# Patient Record
Sex: MALE | Race: WHITE
[De-identification: names, ages, dates, MRNs, and addresses within clinical notes are randomized per-mention and may not be internally consistent; named-entity substitution may affect disease eponyms.]

---

## 2019-02-05 ENCOUNTER — HOSPITAL ENCOUNTER (INPATIENT)
Dept: HOSPITAL 97 - ER | Age: 79
LOS: 3 days | Discharge: HOME HEALTH | DRG: 291 | End: 2019-02-08
Attending: FAMILY MEDICINE | Admitting: INTERNAL MEDICINE
Payer: COMMERCIAL

## 2019-02-05 VITALS — BODY MASS INDEX: 38.9 KG/M2

## 2019-02-05 DIAGNOSIS — M06.9: ICD-10-CM

## 2019-02-05 DIAGNOSIS — I50.33: ICD-10-CM

## 2019-02-05 DIAGNOSIS — N18.3: ICD-10-CM

## 2019-02-05 DIAGNOSIS — I25.10: ICD-10-CM

## 2019-02-05 DIAGNOSIS — I48.91: ICD-10-CM

## 2019-02-05 DIAGNOSIS — D63.1: ICD-10-CM

## 2019-02-05 DIAGNOSIS — Z79.4: ICD-10-CM

## 2019-02-05 DIAGNOSIS — E83.51: ICD-10-CM

## 2019-02-05 DIAGNOSIS — R80.9: ICD-10-CM

## 2019-02-05 DIAGNOSIS — E66.01: ICD-10-CM

## 2019-02-05 DIAGNOSIS — E44.0: ICD-10-CM

## 2019-02-05 DIAGNOSIS — E11.65: ICD-10-CM

## 2019-02-05 DIAGNOSIS — N17.9: ICD-10-CM

## 2019-02-05 DIAGNOSIS — I13.0: Primary | ICD-10-CM

## 2019-02-05 DIAGNOSIS — J44.9: ICD-10-CM

## 2019-02-05 DIAGNOSIS — M10.9: ICD-10-CM

## 2019-02-05 DIAGNOSIS — E11.22: ICD-10-CM

## 2019-02-05 DIAGNOSIS — Z79.01: ICD-10-CM

## 2019-02-05 DIAGNOSIS — I27.20: ICD-10-CM

## 2019-02-05 DIAGNOSIS — J96.10: ICD-10-CM

## 2019-02-05 LAB
ALBUMIN SERPL BCP-MCNC: 2.8 G/DL (ref 3.4–5)
ALP SERPL-CCNC: 203 U/L (ref 45–117)
ALT SERPL W P-5'-P-CCNC: 103 U/L (ref 12–78)
ANISOCYTOSIS BLD QL: (no result)
AST SERPL W P-5'-P-CCNC: 53 U/L (ref 15–37)
BLD SMEAR INTERP: (no result)
BUN BLD-MCNC: 65 MG/DL (ref 7–18)
GLUCOSE SERPLBLD-MCNC: 334 MG/DL (ref 74–106)
HCT VFR BLD CALC: 30.2 % (ref 39.6–49)
HYPOCHROMIA BLD QL: (no result)
LIPASE SERPL-CCNC: 496 U/L (ref 73–393)
LYMPHOCYTES # SPEC AUTO: 1.7 K/UL (ref 0.7–4.9)
MAGNESIUM SERPL-MCNC: 1.9 MG/DL (ref 1.8–2.4)
MORPHOLOGY BLD-IMP: (no result)
OVALOCYTES BLD QL SMEAR: (no result)
PMV BLD: 9 FL (ref 7.6–11.3)
POLYCHROMASIA BLD QL SMEAR: (no result)
POTASSIUM SERPL-SCNC: 3.6 MMOL/L (ref 3.5–5.1)
RBC # BLD: 3.31 M/UL (ref 4.33–5.43)
TROPONIN (EMERG DEPT USE ONLY): 0.02 NG/ML (ref 0–0.04)

## 2019-02-05 PROCEDURE — 84100 ASSAY OF PHOSPHORUS: CPT

## 2019-02-05 PROCEDURE — 85025 COMPLETE CBC W/AUTO DIFF WBC: CPT

## 2019-02-05 PROCEDURE — 93306 TTE W/DOPPLER COMPLETE: CPT

## 2019-02-05 PROCEDURE — 99284 EMERGENCY DEPT VISIT MOD MDM: CPT

## 2019-02-05 PROCEDURE — 81003 URINALYSIS AUTO W/O SCOPE: CPT

## 2019-02-05 PROCEDURE — 80048 BASIC METABOLIC PNL TOTAL CA: CPT

## 2019-02-05 PROCEDURE — 84550 ASSAY OF BLOOD/URIC ACID: CPT

## 2019-02-05 PROCEDURE — 83880 ASSAY OF NATRIURETIC PEPTIDE: CPT

## 2019-02-05 PROCEDURE — 97530 THERAPEUTIC ACTIVITIES: CPT

## 2019-02-05 PROCEDURE — 93005 ELECTROCARDIOGRAM TRACING: CPT

## 2019-02-05 PROCEDURE — 83735 ASSAY OF MAGNESIUM: CPT

## 2019-02-05 PROCEDURE — 80076 HEPATIC FUNCTION PANEL: CPT

## 2019-02-05 PROCEDURE — 80061 LIPID PANEL: CPT

## 2019-02-05 PROCEDURE — 97163 PT EVAL HIGH COMPLEX 45 MIN: CPT

## 2019-02-05 PROCEDURE — 71045 X-RAY EXAM CHEST 1 VIEW: CPT

## 2019-02-05 PROCEDURE — 36415 COLL VENOUS BLD VENIPUNCTURE: CPT

## 2019-02-05 PROCEDURE — 81015 MICROSCOPIC EXAM OF URINE: CPT

## 2019-02-05 PROCEDURE — 94760 N-INVAS EAR/PLS OXIMETRY 1: CPT

## 2019-02-05 PROCEDURE — 84484 ASSAY OF TROPONIN QUANT: CPT

## 2019-02-05 PROCEDURE — 82962 GLUCOSE BLOOD TEST: CPT

## 2019-02-05 PROCEDURE — 96374 THER/PROPH/DIAG INJ IV PUSH: CPT

## 2019-02-05 PROCEDURE — 97116 GAIT TRAINING THERAPY: CPT

## 2019-02-05 PROCEDURE — 83690 ASSAY OF LIPASE: CPT

## 2019-02-05 NOTE — P.HP
Certification for Inpatient


Patient admitted to: Inpatient


With expected LOS: >2 Midnights


Practitioner: I am a practitioner with admitting privileges, knowledge of 

patient current condition, hospital course, and medical plan of care.


Services: Services provided to patient in accordance with Admission 

requirements found in Title 42 Section 412.3 of the Code of Federal Regulations





Patient History


Date of Service: 02/05/19


Reason for admission: acute on chronic systolic CHF


History of Present Illness: 





Mr Gloria is an 79 years old male with history of DM II, COPD with home oxygen 

at 3.5L by NC, HTN, Chronic A.Fib anticoagulated with apixiban, chronic 

systolic CHF, last ECHO shows EF 35%, admitted to [facility] about 1 month ago due to 

pulmonary edema, complicated with respiratory failure, requiring mechanical 

ventilation. The patient came to ED at this time complaining of progressive 

increasing in LE edema, also abdominal girth increased. He complain of SOB with 

minimal activity, improving with rest. His nephrologist has increased Bumex 

dose recently due to his symptoms, but did not work. No fever or chills 

reported. Lab work remarkable for abnormal renal function, elevated proBNP. CXR 

consistent with pulmonary edema. 


Allergies





No Known Allergies Allergy (Unverified 02/05/19 20:33)


 





Home medications list reviewed: Yes


Home Medications: 








Allopurinol 300 mg PO DAILY 02/05/19 


Apixaban [Eliquis *] 2.5 mg PO BID 02/05/19 


Brimonidine Tartrate [Alphagan P] 1 drop OP BEDTIME 02/05/19 


Bumetanide [Bumex] 2 mg PO BID 02/05/19 


Dorzolamide HCl/Pf [Dorzolamide 2% Eye Drop] 1 drop OP DAILY 02/05/19 


Ferrous Sulfate 325 mg PO TIDWM 02/05/19 


Gabapentin 900 mg PO BID 02/05/19 


Insulin Detemir [Levemir] 16 unit SQ BID 02/05/19 


Metoprolol Tartrate 50 mg PO BID 02/05/19 


Simvastatin 20 mg PO DAILY 02/05/19 


Tamsulosin HCl 0.4 mg PO DAILY 02/05/19 


predniSONE [Prednisone*] 5 mg PO DAILY 02/05/19 


traMADol HCL [Ultram*] 50 mg PO BIDP PRN 02/05/19 








- Past Medical/Surgical History


Has patient received pneumonia vaccine in the past: Yes


Diabetic: Yes


-: IDDM


-: CHF


-: Afib


-: MI


-: Rheumatoid Arthritis


-: Chronic Kidney Disease


-: Knees bilaterally


-: kidney





- Family History


  ** Sister


-: Hypertension, Diabetes, Cancer





- Social History


Smoking Status: Never smoker


Alcohol use: No


CD- Drugs: No


Caffeine use: Yes


Place of Residence: Home





Review of Systems


10-point ROS is otherwise unremarkable





Physical Examination





- Vital Signs


Temperature: 98.2 F


Blood Pressure: 130/76


Pulse: 90


Respirations: 16


Pulse Ox (%): 98





- Physical Exam


General: Alert, In no apparent distress


HEENT: Atraumatic, PERRLA, Mucous membr. moist/pink, EOMI, Sclerae nonicteric


Neck: Supple, 2+ carotid pulse no bruit, No LAD, Without JVD or thyroid 

abnormality


Respiratory: Diminished, Crackles/rales (bibasilar rales)


Cardiovascular: Normal S1 S2, Irregular heart rate/rhythm


Gastrointestinal: Normal bowel sounds, No tenderness


Musculoskeletal: No tenderness, Swelling (LE edema 2+)


Integumentary: No rashes


Neurological: Normal speech, Normal strength at 5/5 x4 extr, Normal tone, 

Normal affect


Lymphatics: No axilla or inguinal lymphadenopathy





- Studies


Laboratory Data (last 24 hrs)





02/05/19 17:40: WBC 6.7, Hgb 9.2 L, Hct 30.2 L, Plt Count 183


02/05/19 17:40: Sodium 141, Potassium 3.6, BUN 65 H, Creatinine 2.46 H, Glucose 

334 H, Magnesium 1.9, Total Bilirubin 0.2, AST 53 H,  H, Alkaline 

Phosphatase 203 H, Lipase 496 H








Assessment and Plan





- Problems (Diagnosis)


(1) Acute on chronic systolic CHF (congestive heart failure)


Current Visit: Yes   Status: Acute   





(2) Diabetes mellitus


Current Visit: Yes   Status: Acute   


Qualifiers: 


   Diabetes mellitus type: type 2   Diabetes mellitus long term insulin use: 

with long term use   Diabetes mellitus complication status: with unspecified 

complications   Qualified Code(s): E11.8 - Type 2 diabetes mellitus with 

unspecified complications; Z79.4 - Long term (current) use of insulin   





(3) COPD (chronic obstructive pulmonary disease)


Current Visit: Yes   Status: Acute   


Qualifiers: 


   COPD type: unspecified COPD   Qualified Code(s): J44.9 - Chronic obstructive 

pulmonary disease, unspecified   





(4) HTN (hypertension)


Current Visit: Yes   Status: Acute   


Qualifiers: 


   Hypertension type: essential hypertension   Qualified Code(s): I10 - 

Essential (primary) hypertension   





(5) CAD (coronary artery disease)


Current Visit: Yes   Status: Acute   


Qualifiers: 


   Coronary Disease-Associated Artery/Lesion type: native artery   Native vs. 

transplanted heart: native heart   Associated angina: without angina   

Qualified Code(s): I25.10 - Atherosclerotic heart disease of native coronary 

artery without angina pectoris   





- Plan





Will admit the patient due to acute on chronic CHF exacerbation. Will order IV 

lasix, he has recent cardiac work up done in [facility]. Will order cardiology 

consult.





- Advance Directives


Does patient have a Living Will: Yes


Does patient have a Durable POA for Healthcare: Yes





- Code Status/Comfort Care


Code Status Assessed: Yes


Code Status: Full Code

## 2019-02-05 NOTE — EDPHYS
Physician Documentation                                                                           

 [facility]                                                                

Name: Ken Gloria                                                                                

Age: 79 yrs                                                                                       

Sex: Male                                                                                         

: 1940                                                                                   

MRN: [id number]                                                                                   

Arrival Date: 2019                                                                          

Time: 16:24                                                                                       

Account#: [id number]                                                                            

Bed 19                                                                                            

Private MD:                                                                                       

ED Physician Kevin Doss                                                                      

HPI:                                                                                              

                                                                                             

19:37 This 79 yrs old  Male presents to ER via Wheelchair with complaints of         ps1 

      Breathing Difficulty.                                                                       

19:37 Patient has an extensive medical history with prolonged admission at [facility] IDDM, PEA     ps1 

      arrest in Dec, CKD, LVEF35% on bumex and now is fluid overloaded. Short of breath and       

      increased weight gain over last week. Leg and abdomen swelling. Can speak in full           

      sentences but labored. No fever. .                                                          

                                                                                                  

Historical:                                                                                       

- Allergies:                                                                                      

16:35 No Known Allergies;                                                                     rb1 

- PMHx:                                                                                           

16:35 Diabetes - IDDM; CHF; A-fib; Myocardial infarction; Rheumatoid Arthritis;               rb1 

- PSHx:                                                                                           

16:35 bilateral knees; kidney;                                                                rb1 

                                                                                                  

- Immunization history:: Adult Immunizations up to date.                                          

- Social history:: Smoking status: Patient/guardian denies using tobacco.                         

- Ebola Screening: : Patient negative for fever greater than or equal to 101.5 degrees            

  Fahrenheit, and additional compatible Ebola Virus Disease symptoms.                             

                                                                                                  

                                                                                                  

ROS:                                                                                              

19:37 Constitutional: Negative for fever, chills, and weight loss, Eyes: Negative for injury, ps1 

      pain, redness, and discharge, Cardiovascular: Negative for chest pain, palpitations,        

      and edema, Abdomen/GI: Negative for abdominal pain, nausea, vomiting, diarrhea, and         

      constipation, Back: Negative for injury and pain, Skin: Negative for injury, rash, and      

      discoloration, Neuro: Negative for headache, weakness, numbness, tingling, and seizure.     

19:37 Respiratory: Positive for dyspnea on exertion, shortness of breath, on exertion.            

19:37 MS/extremity: Positive for swelling, of the right leg and left leg.                         

                                                                                                  

Exam:                                                                                             

19:37 Constitutional:  This is a well developed, well nourished patient who is awake, alert,  ps1 

      and in no acute distress. Head/Face:  Normocephalic, atraumatic. Eyes:  Pupils equal        

      round and reactive to light, extra-ocular motions intact.  Lids and lashes normal.          

      Conjunctiva and sclera are non-icteric and not injected. Chest/axilla:  Normal chest        

      wall appearance and motion.  Nontender with no deformity.  No lesions are appreciated.      

      Cardiovascular:  Regular rate and rhythm.  No gallops, murmurs, or rubs.  Normal PMI,       

      no JVD.  No pulse deficits. Abdomen/GI:  Soft, non-tender, with normal bowel sounds.        

      No distension or tympany.  No guarding or rebound.  No evidence of tenderness               

      throughout. Back:  No spinal tenderness.  No costovertebral tenderness.  Full range of      

      motion. Skin:  Warm, dry with normal turgor.  Normal color with no rashes, no lesions,      

      and no evidence of cellulitis. Neuro:  Awake and alert, GCS 15, oriented to person,         

      place, time, and situation.  Cranial nerves II-XII grossly intact. Sensory grossly          

      intact.                                                                                     

19:37 Respiratory: the patient does not display signs of respiratory distress,  Respirations:     

      labored breathing, that is mild, Breath sounds: rhonchi.                                    

19:37 Musculoskeletal/extremity: Extremities: bilateral lower extremity edema extending           

      proximally towards the abdomen.                                                             

                                                                                                  

Vital Signs:                                                                                      

16:35  / 79; Pulse 71; Resp 22; Temp 97.4(TE); Pulse Ox 99% on 3 lpm NC; Weight 116.12  rb1 

      kg (R); Height 5 ft. 7 in. (170.18 cm) (R); Pain 0/10;                                      

17:29  / 78; Pulse 96 MON; Resp 21; Pulse Ox 100% on 3.5 lpm NC;                        sv  

19:05  / 71; Pulse 96; Resp 18 S; Pulse Ox 100% on R/A;                                 jd3 

20:15  / 56; Pulse 92; Resp 15; Pulse Ox 97% on 2 lpm NC;                               jb4 

16:35 Body Mass Index 40.09 (116.12 kg, 170.18 cm)                                            rb1 

17:29 A fib with Occasional PVCs                                                              sv  

                                                                                                  

MDM:                                                                                              

17:12 Patient medically screened.                                                             ps1 

19:37 Data reviewed: vital signs, nurses notes, lab test result(s), EKG, radiologic studies,  ps1 

      and as a result, I will admit patient. Counseling: I had a detailed discussion with the     

      patient and/or guardian regarding: the historical points, exam findings, and any            

      diagnostic results supporting the discharge/admit diagnosis, lab results, radiology         

      results, the need for further work-up and treatment in the hospital.                        

                                                                                                  

02/05                                                                                             

17:35 Order name: BMP; Complete Time: 19:13                                                   ps1 

                                                                                             

17:35 Order name: CBC with Diff; Complete Time: 19:13                                         ps1 

                                                                                             

17:35 Order name: Hepatic Function; Complete Time: 19:13                                      ps1 

                                                                                             

17:35 Order name: Lipase; Complete Time: 19:13                                                ps1 

                                                                                             

17:35 Order name: Magnesium; Complete Time: 19:13                                             ps1 

                                                                                             

17:35 Order name: NT PRO-BNP; Complete Time: 19:13                                            ps1 

                                                                                             

17:26 Order name: EKG; Complete Time: 17:26                                                   sv  

                                                                                             

17:26 Order name: EKG - Nurse/Tech; Complete Time: 17:28                                      sv  

                                                                                             

17:35 Order name: XRAY CXR (1 view); Complete Time: 19:13                                     ps1 

                                                                                             

17:35 Order name: Troponin (emerg Dept Use Only); Complete Time: 19:13                        ps1 

                                                                                             

17:35 Order name: Cardiac monitoring; Complete Time: 17:42                                    ps1 

                                                                                             

17:35 Order name: IV Saline Lock; Complete Time: 17:42                                        ps1 

                                                                                             

19:01 Order name: CBC Smear Scan; Complete Time: 19:13                                        EDMS

                                                                                             

17:35 Order name: Labs collected and sent; Complete Time: 17:42                               ps1 

                                                                                             

17:35 Order name: O2 Per Protocol; Complete Time: 17:42                                       ps1 

                                                                                             

17:35 Order name: O2 Sat Monitoring; Complete Time: 17:42                                     ps1 

                                                                                                  

Administered Medications:                                                                         

17:55 Drug: Bumex 1.25 mg Route: IVP; Site: right antecubital;                                sv  

18:15 Follow up: Response: No adverse reaction                                                sv  

                                                                                                  

                                                                                                  

Disposition:                                                                                      

19 19:54 Hospitalization ordered by Isabel Esqueda for Inpatient Admission. Preliminary    

  diagnosis are acute chf exacerbation, CKD, pedal edema.                                         

- Bed requested for Telemetry/MedSurg (Inpatient).                                                

- Status is Inpatient Admission.                                                              fc  

- Condition is Fair.                                                                              

- Problem is an acute exacerbation.                                                               

- Symptoms are unchanged.                                                                         

UTI on Admission? No                                                                              

                                                                                                  

                                                                                                  

                                                                                                  

Signatures:                                                                                       

Dispatcher [employer]                           Jeri Fowler RN RN sv Webb, Martha, RN RN                                                      

Letitia Chong RN RN                                                      

Tabby Alexander RN                     RN   Jm Melendezip, MD                     MD   ps1                                                  

                                                                                                  

Corrections: (The following items were deleted from the chart)                                    

20:25 19:54 Hospitalization Ordered by Isabel Esqueda MD for Inpatient Admission. Preliminary mw  

      diagnosis is acute chf exacerbation; CKD; pedal edema. Bed requested for                    

      Telemetry/MedSurg (Inpatient). Status is Inpatient Admission. Condition is Fair.            

      Problem is an acute exacerbation. Symptoms are unchanged. UTI on Admission? No. ps1         

22:14 20:25 2019 19:54 Hospitalization Ordered by Isabel Esqueda MD for Inpatient       fc  

      Admission. Preliminary diagnosis is acute chf exacerbation; CKD; pedal edema. Bed           

      requested for Telemetry/MedSurg (Inpatient). Status is Inpatient Admission. Condition       

      is Fair. Problem is an acute exacerbation. Symptoms are unchanged. UTI on Admission?        

      No. mw                                                                                      

                                                                                                  

**************************************************************************************************

## 2019-02-05 NOTE — RAD REPORT
EXAM DESCRIPTION:  RAD - Chest Single View - 2/5/2019 6:34 pm

 

CLINICAL HISTORY:  DYSPNEA

Chest pain.

 

COMPARISON:  No comparisons

 

FINDINGS:  Portable technique limits examination quality.

 

Moderate bilateral pulmonary opacities are noted likely representing pulmonary edema. The heart is mi
ldly enlarged in size. No displaced fractures.

 

IMPRESSION:  Moderate CHF versus volume overload pattern.

## 2019-02-05 NOTE — ER
Nurse's Notes                                                                                     

 [facility]                                                                

Name: Ken Gloria                                                                                

Age: 79 yrs                                                                                       

Sex: Male                                                                                         

: 1940                                                                                   

MRN: [id number]                                                                                   

Arrival Date: 2019                                                                          

Time: 16:24                                                                                       

Account#: [id number]                                                                            

Bed 19                                                                                            

Private MD:                                                                                       

Diagnosis: acute chf exacerbation;CKD;pedal edema                                                 

                                                                                                  

Presentation:                                                                                     

                                                                                             

16:35 Presenting complaint: Patient states: c/o shortness of breath. Transition of care:      rb1 

      patient was not received from another setting of care.                                      

16:35 Method Of Arrival: Wheelchair                                                           rb1 

16:35 Acuity: DEWAYNE 3                                                                           rb1 

16:35 Onset of symptoms was 2019. Risk Assessment: Do you want to hurt yourself  sv  

      or someone else? Patient reports no desire to harm self or others. Initial Sepsis           

      Screen: Does the patient meet any 2 criteria? No. Patient's initial sepsis screen is        

      negative. Does the patient have a suspected source of infection? No. Patient's initial      

      sepsis screen is negative. Note Pt has gained 4 lbs overnight, reported by spouse. Care     

      prior to arrival: None.                                                                     

                                                                                                  

Triage Assessment:                                                                                

16:35 General: Appears in no apparent distress. uncomfortable, obese, well developed,         sv  

      Behavior is calm, cooperative, appropriate for age. Pain: Denies pain. Neuro: Level of      

      Consciousness is awake, alert, obeys commands, Oriented to person, place, time,             

      situation, Gait is steady, Speech is normal. Cardiovascular: Patient's skin is warm and     

      dry. Edema is 3+ to left midcalf, left ankle, left foot, right midcalf, right ankle and     

      right foot pitting to left midcalf, left ankle, left foot, right midcalf, right ankle       

      and right foot. Respiratory: Reports shortness of breath on exertion Airway is patent       

      Respiratory effort is even, unlabored, Respiratory pattern is regular, tachypnea Onset:     

      The symptoms/episode began/occurred yesterday, the patient has mild shortness of            

      breath. Derm: Skin is normal.                                                               

                                                                                                  

Historical:                                                                                       

- Allergies:                                                                                      

16:35 No Known Allergies;                                                                     rb1 

- PMHx:                                                                                           

16:35 Diabetes - IDDM; CHF; A-fib; Myocardial infarction; Rheumatoid Arthritis;               rb1 

- PSHx:                                                                                           

16:35 bilateral knees; kidney;                                                                rb1 

                                                                                                  

- Immunization history:: Adult Immunizations up to date.                                          

- Social history:: Smoking status: Patient/guardian denies using tobacco.                         

- Ebola Screening: : Patient negative for fever greater than or equal to 101.5 degrees            

  Fahrenheit, and additional compatible Ebola Virus Disease symptoms.                             

                                                                                                  

                                                                                                  

Screenin:35 Abuse screen: Denies threats or abuse. Nutritional screening: No deficits noted.        rb1 

      Tuberculosis screening: No symptoms or risk factors identified.                             

17:55 Fall Risk None identified.                                                              sv  

                                                                                                  

Assessment:                                                                                       

17:30 Cardiovascular: Rhythm is atrial fibrillation With PVC's.                               sv  

17:55 Reassessment: Patient appears in no apparent distress at this time. Patient and/or      sv  

      family updated on plan of care and expected duration. Pain level reassessed. Patient is     

      alert, oriented x 3, equal unlabored respirations, skin warm/dry/pink.                      

18:15 Reassessment: Patient appears in no apparent distress at this time. Patient and/or      sv  

      family updated on plan of care and expected duration. Pain level reassessed. Patient is     

      alert, oriented x 3, equal unlabored respirations, skin warm/dry/pink. Patient states       

      feeling better. Patient states symptoms have improved.                                      

19:00 Reassessment: Patient appears in no apparent distress at this time. Patient and/or      jb4 

      family updated on plan of care and expected duration. Pain level reassessed. A\T\O4.        

19:00 Respiratory: Airway is patent Respiratory effort is even, labored, Respiratory pattern  jb4 

      is regular, symmetrical, Breath sounds are clear.                                           

20:00 Reassessment: Patient appears in no apparent distress at this time. Patient and/or      jb4 

      family updated on plan of care and expected duration. Pain level reassessed. General:.      

      Neuro: Oriented to person, place, time, situation. Respiratory: Airway is patent            

      Respiratory effort is even, labored, Respiratory pattern is regular, symmetrical.           

21:00 Reassessment: Patient appears in no apparent distress at this time. No changes from     jb4 

      previously documented assessment. Patient and/or family updated on plan of care and         

      expected duration. Pain level reassessed. attempted to call report, instructed to wait      

      for call back.                                                                              

21:33 Reassessment: Patient appears in no apparent distress at this time. No changes from     jb4 

      previously documented assessment. Patient and/or family updated on plan of care and         

      expected duration. Pain level reassessed. Report called to NATHALIE Muse.                     

                                                                                                  

Vital Signs:                                                                                      

16:35  / 79; Pulse 71; Resp 22; Temp 97.4(TE); Pulse Ox 99% on 3 lpm NC; Weight 116.12  rb1 

      kg (R); Height 5 ft. 7 in. (170.18 cm) (R); Pain 0/10;                                      

17:29  / 78; Pulse 96 MON; Resp 21; Pulse Ox 100% on 3.5 lpm NC;                        sv  

19:05  / 71; Pulse 96; Resp 18 S; Pulse Ox 100% on R/A;                                 jd3 

20:15  / 56; Pulse 92; Resp 15; Pulse Ox 97% on 2 lpm NC;                               jb4 

16:35 Body Mass Index 40.09 (116.12 kg, 170.18 cm)                                            rb1 

17:29 A fib with Occasional PVCs                                                              sv  

                                                                                                  

ED Course:                                                                                        

16:24 Patient arrived in ED.                                                                  rg4 

16:35 Patient has correct armband on for positive identification. Bed in low position. Call   rb1 

      light in reach. Side rails up X 1. Cardiac monitor on. Pulse ox on. NIBP on.                

16:40 Jeri Chery RN is Primary Nurse.                                                  sv  

16:40 Arm band placed on Patient placed in an exam room, on a stretcher, on cardiac monitor,  sv  

      on pulse oximetry.                                                                          

16:43 Kevin Doss MD is Attending Physician.                                             ps1 

16:53 Triage completed.                                                                       rb1 

17:16 ED physician to see patient.                                                            sv  

17:35 Door closed. Warm blanket given. Head of bed elevated.                                  sv  

17:35 Initial lab(s) drawn, by me, sent to lab. Inserted saline lock: 20 gauge in right       sv  

      antecubital area, using aseptic technique. Blood collected. Flushed right antecubital       

      with 5 ml normal saline.                                                                    

17:49 EKG done, by EKG tech. reviewed by Kevin Doss MD.                                   sm3 

18:34 XRAY CXR (1 view) In Process Unspecified.                                               EDMS

19:10 Report given to Aureliano ZELAYA.                                                            sv  

19:24 Primary Nurse role handed off by Jeri Chery RN                                   sv  

19:53 Isabel Esqueda MD is Hospitalizing Provider.                                          ps1 

21:26 Travis Moreno RN is Primary Nurse.                                                     jb4 

                                                                                                  

Administered Medications:                                                                         

17:55 Drug: Bumex 1.25 mg Route: IVP; Site: right antecubital;                                sv  

18:15 Follow up: Response: No adverse reaction                                                sv  

                                                                                                  

                                                                                                  

Output:                                                                                           

17:40 Urine: 280ml (Voided); Total: 280ml.                                                    sv  

                                                                                                  

Outcome:                                                                                          

19:54 Decision to Hospitalize by Provider.                                                    ps1 

22:14 Patient left the ED.                                                                    fc  

                                                                                                  

Signatures:                                                                                       

Dispatcher [employer]                           EDJeri Brunson RN RN sv Chretien, Felicia, RN RN                                                      

Tabby Alexander RN                     RN   rb1                                                  

Kaye Vicente                                 rg4                                                  

Travis Moreno RN RN   jb4                                                  

Aureliano Cottrell RN RN   jd3                                                  

Kevin Doss MD MD   ps1                                                  

Monse Ballard                              sm3                                                  

                                                                                                  

Corrections: (The following items were deleted from the chart)                                    

19:19 17:29  / 78; Pulse 96bpm; Monitor: A fib with Occasional PVCsResp 21bpm; Pulse Ox sv  

      100%; sv                                                                                    

20:59 19:00 Reassessment: Patient appears in no apparent distress at this time. Patient       jb4 

      and/or family updated on plan of care and expected duration. Pain level reassessed.         

      Patient is alert, oriented x 3, equal unlabored respirations, skin warm/dry/pink. jb4       

20:59 20:00 Reassessment: Patient appears in no apparent distress at this time. Patient       jb4 

      and/or family updated on plan of care and expected duration. Pain level reassessed.         

      Patient is alert, oriented x 3, equal unlabored respirations, skin warm/dry/pink. jb4       

21:43 21:00 Reassessment: Patient appears in no apparent distress at this time. No changes    jb4 

      from previously documented assessment. Patient and/or family updated on plan of care        

      and expected duration. Pain level reassessed. jb4                                           

                                                                                                  

**************************************************************************************************

## 2019-02-06 LAB
BUN BLD-MCNC: 67 MG/DL (ref 7–18)
GLUCOSE SERPLBLD-MCNC: 124 MG/DL (ref 74–106)
HCT VFR BLD CALC: 30.6 % (ref 39.6–49)
HDLC SERPL-MCNC: 41 MG/DL (ref 40–60)
LDLC SERPL CALC-MCNC: 27 MG/DL (ref ?–130)
LYMPHOCYTES # SPEC AUTO: 1.8 K/UL (ref 0.7–4.9)
PMV BLD: 8.9 FL (ref 7.6–11.3)
POTASSIUM SERPL-SCNC: 3.6 MMOL/L (ref 3.5–5.1)
RBC # BLD: 3.4 M/UL (ref 4.33–5.43)
UA COMPLETE W REFLEX CULTURE PNL UR: (no result)
UA DIPSTICK W REFLEX MICRO PNL UR: (no result)

## 2019-02-06 RX ADMIN — ALBUMIN (HUMAN) SCH MG: 5 SOLUTION INTRAVENOUS at 00:13

## 2019-02-06 RX ADMIN — ALBUMIN (HUMAN) SCH MG: 5 SOLUTION INTRAVENOUS at 16:31

## 2019-02-06 RX ADMIN — HUMAN INSULIN SCH UNIT: 100 INJECTION, SOLUTION SUBCUTANEOUS at 12:58

## 2019-02-06 RX ADMIN — METOPROLOL TARTRATE SCH MG: 25 TABLET ORAL at 17:58

## 2019-02-06 RX ADMIN — HUMAN INSULIN SCH: 100 INJECTION, SOLUTION SUBCUTANEOUS at 07:30

## 2019-02-06 RX ADMIN — APIXABAN SCH MG: 2.5 TABLET, FILM COATED ORAL at 08:21

## 2019-02-06 RX ADMIN — ALBUMIN (HUMAN) SCH MG: 5 SOLUTION INTRAVENOUS at 08:20

## 2019-02-06 RX ADMIN — Medication SCH ML: at 20:43

## 2019-02-06 RX ADMIN — HYDRALAZINE HYDROCHLORIDE SCH MG: 25 TABLET, FILM COATED ORAL at 20:41

## 2019-02-06 RX ADMIN — ALBUMIN (HUMAN) SCH MG: 5 SOLUTION INTRAVENOUS at 23:48

## 2019-02-06 RX ADMIN — TRAMADOL HYDROCHLORIDE PRN MG: 50 TABLET, COATED ORAL at 20:53

## 2019-02-06 RX ADMIN — HYDRALAZINE HYDROCHLORIDE SCH: 25 TABLET, FILM COATED ORAL at 13:00

## 2019-02-06 RX ADMIN — Medication SCH ML: at 08:22

## 2019-02-06 RX ADMIN — SPIRONOLACTONE SCH MG: 25 TABLET, FILM COATED ORAL at 23:47

## 2019-02-06 RX ADMIN — HUMAN INSULIN SCH UNIT: 100 INJECTION, SOLUTION SUBCUTANEOUS at 00:14

## 2019-02-06 RX ADMIN — HUMAN INSULIN SCH: 100 INJECTION, SOLUTION SUBCUTANEOUS at 20:42

## 2019-02-06 RX ADMIN — INSULIN GLARGINE SCH UNITS: 100 INJECTION, SOLUTION SUBCUTANEOUS at 20:42

## 2019-02-06 RX ADMIN — IRON SUPPLEMENT SCH MG: 325 TABLET ORAL at 16:31

## 2019-02-06 RX ADMIN — HYDRALAZINE HYDROCHLORIDE SCH MG: 25 TABLET, FILM COATED ORAL at 08:21

## 2019-02-06 RX ADMIN — Medication SCH ML: at 00:14

## 2019-02-06 RX ADMIN — APIXABAN SCH MG: 2.5 TABLET, FILM COATED ORAL at 20:41

## 2019-02-06 RX ADMIN — HUMAN INSULIN SCH UNIT: 100 INJECTION, SOLUTION SUBCUTANEOUS at 16:30

## 2019-02-06 RX ADMIN — ATORVASTATIN CALCIUM SCH MG: 10 TABLET, FILM COATED ORAL at 20:41

## 2019-02-06 NOTE — P.CNS
Date of Consult: 02/06/19


Reason for Consult: FELIZ


Requesting Physician: Kelli Mariscal


Primary Care Provider: Dr. Hewitt


Chief Complaint: acute on chronic systolic CHF


History of Present Illness: 





Mr Gloria is an 79 years old male with history of DM II, COPD with home oxygen 

at 3.5L by NC, HTN, Chronic A.Fib anticoagulated with apixiban, chronic 

systolic CHF, last ECHO shows EF 35%, admitted to [facility] about 1 month ago due to 

pulmonary edema, complicated with respiratory failure, requiring mechanical 

ventilation. The patient came to ED at this time complaining of progressive 

increasing in LE edema, also abdominal girth increased. He complain of SOB with 

minimal activity, improving with rest. His nephrologist has increased Bumex 

dose recently due to his symptoms, but did not work. No fever or chills 

reported. Lab work remarkable for abnormal renal function, elevated proBNP. CXR 

consistent with pulmonary edema. 





19:37 This 79 yrs old  Male presents to ER via Wheelchair with 

complaints of         ps1 


      Breathing Difficulty.                                                    

                   


19:37 Patient has an extensive medical history with prolonged admission at [facility] 

IDDM, PEA     ps1 


      arrest in Dec, CKD, LVEF35% on bumex and now is fluid overloaded. Short 

of breath and       


      increased weight gain over last week. Leg and abdomen swelling. Can speak 

in full           


      sentences but labored. No fever. 


Allergies





No Known Allergies Allergy (Unverified 02/05/19 20:33)


 





Home medications list reviewed: Yes


Home Medications: 








Allopurinol 300 mg PO DAILY 02/05/19 


Apixaban [Eliquis *] 2.5 mg PO BID 02/05/19 


Brimonidine Tartrate [Alphagan P] 1 drop OP BEDTIME 02/05/19 


Bumetanide [Bumex] 2 mg PO BID 02/05/19 


Dorzolamide HCl/Pf [Dorzolamide 2% Eye Drop] 1 drop OP DAILY 02/05/19 


Ferrous Sulfate 325 mg PO TIDWM 02/05/19 


Gabapentin 900 mg PO BID 02/05/19 


Insulin Detemir [Levemir] 16 unit SQ BID 02/05/19 


Metoprolol Tartrate 50 mg PO BID 02/05/19 


Simvastatin 20 mg PO DAILY 02/05/19 


Tamsulosin HCl 0.4 mg PO DAILY 02/05/19 


predniSONE [Prednisone*] 5 mg PO DAILY 02/05/19 


traMADol HCL [Ultram*] 50 mg PO BIDP PRN 02/05/19 








- Past Medical/Surgical History


Diabetic: Yes


-: IDDM


-: CHF


-: Afib


-: MI


-: Rheumatoid Arthritis


-: Chronic Kidney Disease


-: Knees bilaterally


-: kidney





- Family History


  ** Sister


Medical History: Hypertension, Diabetes, Cancer





- Social History


Alcohol use: No


CD- Drugs: No


Caffeine use: Yes


Place of Residence: Home





Review of Systems


10-point ROS is otherwise unremarkable


General: Weakness, Malaise


Respiratory: SOB with Excertion


Cardiovascular: Edema


Neurological: Weakness





Physical Examination














Temp Pulse Resp BP Pulse Ox


 


 98.3 F   92 H  18   121/80   95 


 


 02/06/19 16:00  02/06/19 17:58  02/06/19 16:00  02/06/19 17:58  02/06/19 16:00








General: Oriented x3, Cooperative


HEENT: Normocephalic


Neck: Supple, JVD distended


Respiratory: Diminished


Cardiovascular: Regular rate/rhythm, Edema


Gastrointestinal: Soft and benign, Non-distended


Musculoskeletal: No clubbing, No contractures


Integumentary: No rashes


Neurological: Normal speech





Blood work reviewed in the chart.


Initial serum creatinine 2.46


Imagings Data: 





EXAM DESCRIPTION:  RAD - Chest Single View - 2/5/2019 6:34 pm


CLINICAL HISTORY:  DYSPNEA


Chest pain.


COMPARISON:  No comparisons


FINDINGS:  Portable technique limits examination quality.


Moderate bilateral pulmonary opacities are noted likely representing pulmonary 

edema. The heart is mildly enlarged in size. No displaced fractures.


IMPRESSION:  Moderate CHF versus volume overload pattern.





   DIASTOLIC (NORMALS)              SYSTOLIC (NORMALS)


IVSd                 1.4 (0.6-1.2)                   LA Diam 4.3 (1.9-4.0)     

LVEF         69%  


LVIDd               4.3 (3.5-5.7)                       LVIDs      2.7 (2.0-3.5)

     %FS          39%


LVPWd             1.5 (0.6-1.2)


Ao Diam           2.5 (2.0-3.7)





2 DIMENSIONAL ASSESSMENT:











RIGHT ATRIUM:                   DILATED LEFT ATRIUM:       DILATED


 


RIGHT VENTRICLE:            DILATED LEFT VENTRICLE: NORMAL


 


TRICUSPID VALVE:             NORMAL MITRAL VALVE:     NORMAL


 


PULMONIC VALVE:             NORMAL AORTIC VALVE:     SCLEROSIS


 


PERICARDIAL EFFUSION: NONE AORTIC ROOT:      NORMAL








LEFT VENTRICULAR WALL MOTION:     NORMAL.


DOPPLER/COLOR FLOW:     NO AORTIC STENOSIS OR AORTIC REGURGITATION. MILD MITRAL 

REGURGITATION. MODERATE TRICUSPID REGURGITATION. SEVERE PULMONARY HYPERTENSION. 

ESTIMATED RIGHT VENTRICULAR SYSTOLIC PRESSURE 66mmHg. 


COMMENTS:      NORMAL LEFT VENTRICULAR EJECTION FRACTION. AORTIC SCLEROSIS WITH 

NO AORTIC STENOSIS/ AORTIC REGURGITATION. DILATED LEFT ATRIUM, RIGHT ATRIUM AND 

RIGHT VENTRICLE. MILD MITRAL REGURGITATION. MODERATE TRICUSPID REGURGITATION. 

SEVERE PULMONARY HYPERTENSION.


Conclusions/Impression: 





A/


FELIZ likely CRS.


CKD IV with proteinuria.


A/C Diastolic CHF.


Pulmonary HTN.


DM II with CKD.


HTN with CKD.


Anemia in chronic illness.


BPH with LUTS.


Moderate protein malnutrition.


Hypocalcemia.





P/ Continue current POC and Medications.


Increase Lasix 40mg q6h.


Start Spironolactone 25mg BID.


Start Vitamin D.


No NSAIDs.


AM labs.  Daily weight.





Thank you kindly for the consultation.

## 2019-02-06 NOTE — ECHO
HEIGHT: 5 ft 7 in   WEIGHT: 256 lb 1.6 oz   DATE OF STUDY:  02/06/19   REFER DR: Pedro Alfonso MD

2-DIMENSIONAL: YES

     M.MODE: YES

 DOPPLER: YES

COLOR FLOW: YES



                    TDS:  NO

PORTABLE: NO

 DEFINITY:  NO

BUBBLE STUDY: NO





DIAGNOSIS:  CONGESTIVE HEART FAILURE



CARDIAC HISTORY:  

CATHERIZATION: 

SURGERY: 

PROSTHETIC VALVE: 

PACEMAKER: 





MEASUREMENTS (cm)

    DIASTOLIC (NORMALS)                 SYSTOLIC (NORMALS)

IVSd                 1.4 (0.6-1.2)                    LA Diam 4.3 (1.9-4.0)     LVEF       
  69%  

LVIDd               4.3 (3.5-5.7)                        LVIDs      2.7 (2.0-3.5)     %FS  
        39%

LVPWd             1.5 (0.6-1.2)

Ao Diam           2.5 (2.0-3.7)



2 DIMENSIONAL ASSESSMENT:

RIGHT ATRIUM:                   DILATED

LEFT ATRIUM:       DILATED



RIGHT VENTRICLE:            DILATED

LEFT VENTRICLE: NORMAL



TRICUSPID VALVE:             NORMAL

MITRAL VALVE:     NORMAL



PULMONIC VALVE:             NORMAL

AORTIC VALVE:     SCLEROSIS



PERICARDIAL EFFUSION: NONE

AORTIC ROOT:      NORMAL





LEFT VENTRICULAR WALL MOTION:     NORMAL.



DOPPLER/COLOR FLOW:     NO AORTIC STENOSIS OR AORTIC REGURGITATION. MILD MITRAL 
REGURGITATION. MODERATE TRICUSPID REGURGITATION. SEVERE PULMONARY HYPERTENSION. ESTIMATED 
RIGHT VENTRICULAR SYSTOLIC PRESSURE 66mmHg. 



COMMENTS:      NORMAL LEFT VENTRICULAR EJECTION FRACTION. AORTIC SCLEROSIS WITH NO AORTIC 
STENOSIS/ AORTIC REGURGITATION. DILATED LEFT ATRIUM, RIGHT ATRIUM AND RIGHT VENTRICLE. 
MILD MITRAL REGURGITATION. MODERATE TRICUSPID REGURGITATION. SEVERE PULMONARY 
HYPERTENSION.



TECHNOLOGIST:   SOHA NUÑEZ

## 2019-02-06 NOTE — PN
Date of Progress Note:  02/06/2019



Subjective:  The patient seen and examined.  Chart reviewed and case discussed with RN.  The patient 
states that he is still having some difficulty breathing, unable to sleep.  He is on oxygen at home a
s well.



Medications:  List reviewed.



Code Status:  Full code.



Physical Examination:

Vital Signs:  Temperature 97.8, heart rate 97, blood pressure 118/63, respirations 18, O2 96% on 2 L 
via nasal cannula. 

General:  Awake, alert, oriented x3.  Elderly male, ill appearing, morbidly obese. 

CV:  S1, S2.  Irregularly irregular.  Peripheral pulses are weak bilaterally. 

Respiratory:  Diminished breath sounds.  Crackles heard.  No wheezing.  No use of accessory muscles. 


Gastrointestinal: Abdomen is soft, distended.  No tenderness.  Bowel sounds positive. 

Extremities:  No clubbing or cyanosis.  The patient has 3+ edema bilateral lower extremities. 

NEURO:  Cranial nerves 2 through 12 intact grossly.  No focal neurological deficit.  Speech is normal
. 

Skin:  No rashes.  Normal skin turgor.



Laboratory Data:  Sodium 143, potassium 3.6, chloride 105, CO2 34, BUN 65, creatinine 2.36, glucose 1
24, calcium 8.2.  WBC 8.6, H and H 9.5, 30.6, platelets 230, neutrophils 65%.  Echocardiogram pending
.



Assessment And Plan:  An 79-year-old male with:

1.Acute on chronic systolic congestive heart failure.  We will continue with congestive heart failur
e guidelines.  Appreciate Dr. Alfonso' input.  Recommend starting Entresto.  The patient is on beta-bl
ocker.  We will hold ACE inhibitor as the patient will need Entresto.  We will check echocardiogram. 
 Monitor I's and O's.  1500 mL fluid restriction.  Daily weights.

2.Diabetes mellitus type 2 with long-term use of insulin with hyperglycemia.  We will resume home do
se of long-acting insulin.  Continue sliding scale.  Continue Accu-Cheks.

3.Chronic obstructive pulmonary disease, chronic bronchitis.  Continue albuterol p.r.n.

4.Chronic respiratory failure due to chronic obstructive pulmonary disease and likely congestive hea
rt failure.  The patient is on 2 L at this time.

5.Morbid obesity.  BMI of 40.

6.Essential hypertension, stable.  Resume home medications.

7.Gout.  We will hold allopurinol due to elevated creatinine.

8.Acute versus acute on chronic kidney injury.  Monitor creatinine.  Consult Nephrology.

9.Coronary artery disease, native artery and native heart without angina, stable.

10.Noncompliance.

11.Atrial fibrillation on Eliquis, controlled ventricular rate, chronic.

12.Rheumatoid arthritis.

13.Acute on chronic kidney disease.  Unknown baseline.  We will need to obtain records.

14.Gastrointestinal and deep venous thrombosis prophylaxis addressed.  The patient is already on api
xaban renally dosed.  Likely discharge in the next 48 to 72 hours pending on clinical response.





/MODL

DD:  02/06/2019 13:13:38Voice ID:  [id number]

DT:  02/06/2019 17:47:54Report ID:  [id number]

## 2019-02-06 NOTE — CON
Chief Complaint:  Shortness of breath, swelling.



Reason For Consult:  Atrial fib and congestive heart failure.



History Of Present Illness:  Mr. Gloria was in [facility].  He gives us a history that he was in the hospit
al for many weeks, underwent a cardiac cath, had a balloon pump therapy.  No interventions were done 
after his cardiac cath.  He does not have a defibrillator.  He was placed on some medications, totall
y had congestive heart failure.  It sounds like he may have actually left the hospital AMA.  Presentl
y, he takes prednisone, Flomax, metoprolol, brimonidine, insulin, gabapentin, iron sulfate, dorzolami
de, bumetanide, simvastatin, Eliquis 2.5 b.i.d., allopurinol, and tramadol.  He has no known allergie
s.  He did not seem to be familiar with any of the medicines or think that he had been in atrial fibr
illation before, but his medication list indicate that probably he has been in atrial fibrillation co
nstantly, need to get records from [facility].  The patient is a poor historian.  He has a history of diabe
lizbeth, hypertension, prostate disease, COPD, glaucoma, and more recently congestive heart failure and a
trial fibrillation.  Also a history of gout.



Physical Examination:

General:  He is 5 feet 7 inches, 256 pounds, obese, alert, oriented, pleasant, not in distress. 

Lungs:  No crackles or wheezes.  Breath sounds are decreased in both bases.  I do not appreciate ____
______ 

Heart:  Irregularly irregular going about 100 beats per minute. 

Abdomen:  Soft.  There is edema of the abdominal wall and thighs, presacral and shins and feet. 



His electrocardiogram shows atrial fibrillation, heart rate 105, nonspecific ST and T-wave abnormalit
y.  He was in sinus rhythm in 2006, but we do not know when the atrial fibrillation started following
 2006.  We suspect it was before November 2018 based on the history he gives us.



Impression:  The patient has probably a nonischemic cardiomyopathy, atrial fibrillation, we should do
 an echocardiogram given diuresis and consider placing him on Entresto if his EF is less than 40%, wh
ich I suspect it control his heart rate with beta blockers and continue to give him his Eliquis. 



Thank you very much for the kind referral of Mr. Gloria.  I will follow him with you.





JE

DD:  02/06/2019 07:53:56Voice ID:  [id number]

DT:  02/06/2019 12:16:35Report ID:  [id number]

## 2019-02-06 NOTE — EKG
Test Date:    2019-01-05               Test Time:    17:32:01

Technician:   BRITTANIE                                     

                                                     

MEASUREMENT RESULTS:                                       

Intervals:                                           

Rate:         105                                    

CO:                                                  

QRSD:         88                                     

QT:           360                                    

QTc:          475                                    

Axis:                                                

P:                                                   

CO:                                                  

QRS:          47                                     

T:            111                                    

                                                     

INTERPRETIVE STATEMENTS:                                       

                                                     

Atrial fibrillation with rapid ventricular response

Low voltage QRS

Nonspecific ST and T wave abnormality, probably digitalis effect

Abnormal ECG

Compared to ECG 06/12/2006 08:43:27

Low QRS voltage now present

ST (T wave) deviation now present

Sinus rhythm no longer present



Electronically Signed On 02-06-19 06:52:04 CST by Pedro Alfonso

## 2019-02-07 VITALS — OXYGEN SATURATION: 97 %

## 2019-02-07 LAB
BUN BLD-MCNC: 68 MG/DL (ref 7–18)
GLUCOSE SERPLBLD-MCNC: 209 MG/DL (ref 74–106)
HCT VFR BLD CALC: 29.6 % (ref 39.6–49)
LYMPHOCYTES # SPEC AUTO: 2.2 K/UL (ref 0.7–4.9)
MAGNESIUM SERPL-MCNC: 2 MG/DL (ref 1.8–2.4)
PMV BLD: 9.1 FL (ref 7.6–11.3)
POTASSIUM SERPL-SCNC: 3.8 MMOL/L (ref 3.5–5.1)
RBC # BLD: 3.31 M/UL (ref 4.33–5.43)
URATE SERPL-MCNC: 6 MG/DL (ref 3.5–7.2)

## 2019-02-07 RX ADMIN — IRON SUPPLEMENT SCH MG: 325 TABLET ORAL at 09:21

## 2019-02-07 RX ADMIN — METOPROLOL TARTRATE SCH MG: 25 TABLET ORAL at 17:41

## 2019-02-07 RX ADMIN — ALBUMIN (HUMAN) SCH MG: 5 SOLUTION INTRAVENOUS at 21:29

## 2019-02-07 RX ADMIN — ATORVASTATIN CALCIUM SCH MG: 10 TABLET, FILM COATED ORAL at 21:30

## 2019-02-07 RX ADMIN — HUMAN INSULIN SCH UNIT: 100 INJECTION, SOLUTION SUBCUTANEOUS at 21:28

## 2019-02-07 RX ADMIN — APIXABAN SCH MG: 2.5 TABLET, FILM COATED ORAL at 21:30

## 2019-02-07 RX ADMIN — Medication SCH ML: at 09:23

## 2019-02-07 RX ADMIN — HYDRALAZINE HYDROCHLORIDE SCH MG: 25 TABLET, FILM COATED ORAL at 09:22

## 2019-02-07 RX ADMIN — TAMSULOSIN HYDROCHLORIDE SCH MG: 0.4 CAPSULE ORAL at 09:21

## 2019-02-07 RX ADMIN — HUMAN INSULIN SCH UNIT: 100 INJECTION, SOLUTION SUBCUTANEOUS at 16:26

## 2019-02-07 RX ADMIN — ALBUMIN (HUMAN) SCH MG: 5 SOLUTION INTRAVENOUS at 05:21

## 2019-02-07 RX ADMIN — HYDRALAZINE HYDROCHLORIDE SCH MG: 25 TABLET, FILM COATED ORAL at 21:30

## 2019-02-07 RX ADMIN — HYDRALAZINE HYDROCHLORIDE SCH MG: 25 TABLET, FILM COATED ORAL at 13:07

## 2019-02-07 RX ADMIN — SPIRONOLACTONE SCH MG: 25 TABLET, FILM COATED ORAL at 09:22

## 2019-02-07 RX ADMIN — Medication SCH ML: at 21:31

## 2019-02-07 RX ADMIN — INSULIN GLARGINE SCH UNITS: 100 INJECTION, SOLUTION SUBCUTANEOUS at 21:28

## 2019-02-07 RX ADMIN — ALBUMIN (HUMAN) SCH MG: 5 SOLUTION INTRAVENOUS at 16:25

## 2019-02-07 RX ADMIN — ALBUMIN (HUMAN) SCH MG: 5 SOLUTION INTRAVENOUS at 09:23

## 2019-02-07 RX ADMIN — METOPROLOL TARTRATE SCH MG: 25 TABLET ORAL at 05:21

## 2019-02-07 RX ADMIN — HUMAN INSULIN SCH: 100 INJECTION, SOLUTION SUBCUTANEOUS at 07:30

## 2019-02-07 RX ADMIN — HUMAN INSULIN SCH UNIT: 100 INJECTION, SOLUTION SUBCUTANEOUS at 12:06

## 2019-02-07 RX ADMIN — IRON SUPPLEMENT SCH MG: 325 TABLET ORAL at 12:07

## 2019-02-07 RX ADMIN — INSULIN GLARGINE SCH UNITS: 100 INJECTION, SOLUTION SUBCUTANEOUS at 09:22

## 2019-02-07 RX ADMIN — SPIRONOLACTONE SCH MG: 25 TABLET, FILM COATED ORAL at 21:29

## 2019-02-07 RX ADMIN — APIXABAN SCH MG: 2.5 TABLET, FILM COATED ORAL at 09:22

## 2019-02-07 RX ADMIN — IRON SUPPLEMENT SCH MG: 325 TABLET ORAL at 16:26

## 2019-02-07 NOTE — PN
Date of Progress Note:  02/07/2019



Subjective:  The patient is seen and examined.  Chart reviewed and case discussed with RN.  Wife at t
he bedside.  Treatment plan explained.  All questions answered.  The patient states he feels better, 
still having some shortness of breath and lower extremity edema.



Medications:  List reviewed.



Physical Examination:

Vital Signs:  Temperature 97.3, heart rate 88, blood pressure 120/73, respirations 20, and O2 of 98% 
on 2 L via nasal cannula. 

General:  Awake, alert, and oriented x3, elderly male, obese, BMI of 39. 

CV:  S1 and S2.  Irregular rate and irregular rhythm.  Peripheral pulses present.  No murmurs. 

Respiratory:  Diminished breath sounds.  Some minimal wheezing heard. 

Gastrointestinal:  Abdomen is soft, nontender, and nondistended.  Positive bowel sounds. 

Extremities:  No clubbing or cyanosis.  The patient has 2+ edema bilateral lower extremities. 

Neurologic:  Nonfocal.



Laboratory Data:  Sodium 141, potassium 3.8, chloride 101, CO2 of 33, BUN 68, creatinine 2.37, glucos
e 209, calcium 8.3, phosphorus 4.5, and magnesium 2.  WBC 9, H and H of 9.3 and 29.6, platelets 201.



Assessment And Plan:  An 79-year-old male with,

1.Acute-on-chronic diastolic heart failure.  Echo shows ejection fraction of 69%.  Also shows severe
 pulmonary hypertension.  We will continue diuretics.  Lasix has been adjusted.  I appreciate Dr. David nunez' input.

2.Acute-on-chronic kidney disease, stage 3.  We will continue with monitoring creatinine.  I appreci
ate nephrology input.

3.Diabetes mellitus type 2 with long-term use of insulin with hyperglycemia.  We will continue slidi
ng scale insulin and Accu-Cheks.

4.Chronic obstructive pulmonary disease and chronic bronchitis.  Continue nebulizer treatments and s
upplemental oxygenation.

5.Chronic respiratory failure due to chronic obstructive pulmonary disease and congestive heart fail
ure.  The patient is on home oxygen.

6.Severe pulmonary hypertension.  We will obtain pulmonary consultation.

7.Morbid obesity, BMI of 40.

8.Essential hypertension, stable.

9.Gout.  Allopurinol on hold due to elevated creatinine.

10.Coronary artery disease, native artery and native heart without angina, stable.

11.Noncompliance.

12.Atrial fibrillation, on Eliquis, controlled ventricular rate, chronic.

13.Rheumatoid arthritis.

14.Gastrointestinal and deep venous thrombosis prophylaxis addressed.  The patient is on apixaban, r
enally dosed. 



PLAN:  We will continue to monitor.  Continue diuretics.  Lasix dose has been adjusted.  Monitor I's 
and O's.  Pulmonology consultation.





ANDIE

DD:  02/07/2019 13:32:43Voice ID:  [id number]

DT:  02/07/2019 18:33:58Report ID:  [id number]

## 2019-02-07 NOTE — PN
Mr. Gloria has not had much of a diuresis.  We have found out that his ejection fraction is normal.  
He has severe pulmonary hypertension, which is a major problem.  It probably warrants a consultation 
with Dr. Higgins, who could recommend any specific therapy to help with that.  The PA pressure estim
ated at 66.  Ejection fraction normal.  So, I think he has mostly right-sided heart failure.  He need
s a lot more diuresis.  He may need chronic oxygen therapy at home.  He is on Eliquis, so I do not th
ink we want to do any specific tests for pulmonary embolic disease, and I think we ought to try to lo
ok into treating pulmonary hypertension with this specific agent.  He needs a lot more diuresis.





SHARON/FRANKIE

DD:  02/07/2019 08:15:49Voice ID:  [id number]

DT:  02/07/2019 13:00:13Report ID:  [id number]

## 2019-02-08 VITALS — DIASTOLIC BLOOD PRESSURE: 69 MMHG | TEMPERATURE: 98 F | SYSTOLIC BLOOD PRESSURE: 119 MMHG

## 2019-02-08 LAB
BUN BLD-MCNC: 65 MG/DL (ref 7–18)
GLUCOSE SERPLBLD-MCNC: 121 MG/DL (ref 74–106)
POTASSIUM SERPL-SCNC: 3.5 MMOL/L (ref 3.5–5.1)

## 2019-02-08 RX ADMIN — TRAMADOL HYDROCHLORIDE PRN MG: 50 TABLET, COATED ORAL at 09:47

## 2019-02-08 RX ADMIN — TAMSULOSIN HYDROCHLORIDE SCH MG: 0.4 CAPSULE ORAL at 09:47

## 2019-02-08 RX ADMIN — HUMAN INSULIN SCH: 100 INJECTION, SOLUTION SUBCUTANEOUS at 07:30

## 2019-02-08 RX ADMIN — HYDRALAZINE HYDROCHLORIDE SCH MG: 25 TABLET, FILM COATED ORAL at 09:47

## 2019-02-08 RX ADMIN — IRON SUPPLEMENT SCH: 325 TABLET ORAL at 12:00

## 2019-02-08 RX ADMIN — APIXABAN SCH MG: 2.5 TABLET, FILM COATED ORAL at 09:46

## 2019-02-08 RX ADMIN — INSULIN GLARGINE SCH UNITS: 100 INJECTION, SOLUTION SUBCUTANEOUS at 09:00

## 2019-02-08 RX ADMIN — METOPROLOL TARTRATE SCH MG: 25 TABLET ORAL at 05:19

## 2019-02-08 RX ADMIN — SPIRONOLACTONE SCH MG: 25 TABLET, FILM COATED ORAL at 09:45

## 2019-02-08 RX ADMIN — Medication SCH ML: at 09:48

## 2019-02-08 RX ADMIN — IRON SUPPLEMENT SCH MG: 325 TABLET ORAL at 09:46

## 2019-02-08 RX ADMIN — ALBUMIN (HUMAN) SCH MG: 5 SOLUTION INTRAVENOUS at 03:57

## 2019-02-08 RX ADMIN — ALBUMIN (HUMAN) SCH: 5 SOLUTION INTRAVENOUS at 10:00

## 2019-02-08 NOTE — PN
Mr. Gloria is well enough to be discharged home.  He has had diuresis, chronic AFib, anticoagulated.





SH/MODL

DD:  02/08/2019 07:50:44Voice ID:  [id number]

DT:  02/08/2019 12:20:14Report ID:  [id number]

## 2019-02-08 NOTE — P.PN
Date of Service: 02/08/19


Vital Signs











Temp Pulse Resp BP Pulse Ox


 


 98.0 F   96 H  20   119/69   99 


 


 02/08/19 12:00  02/08/19 12:00  02/08/19 12:00  02/08/19 12:00  02/08/19 12:00








Assessment/ Plan: 


Nephrology.





Feeling much better and wants to go home.


No acute events overnight.


Good urine output.





Vitals, medications, blood work and imaging reviewed in the chart.


General: Oriented x3, Cooperative


HEENT: Normocephalic


Neck: Supple, JVD distended


Respiratory: Diminished


Cardiovascular: Regular rate/rhythm, Edema


Gastrointestinal: Soft and benign, Non-distended


Musculoskeletal: No clubbing, No contractures


Integumentary: No rashes


Neurological: Normal speech





Blood work reviewed in the chart.


Initial serum creatinine 2.46





Imagings Data: 


EXAM DESCRIPTION:  RAD - Chest Single View - 2/5/2019 6:34 pm


CLINICAL HISTORY:  DYSPNEA


Chest pain.


COMPARISON:  No comparisons


FINDINGS:  Portable technique limits examination quality.


Moderate bilateral pulmonary opacities are noted likely representing pulmonary 

edema. The heart is mildly enlarged in size. No displaced fractures.


IMPRESSION:  Moderate CHF versus volume overload pattern.





   DIASTOLIC (NORMALS)              SYSTOLIC (NORMALS)


IVSd                 1.4 (0.6-1.2)                   LA Diam 4.3 (1.9-4.0)     

LVEF         69%  


LVIDd               4.3 (3.5-5.7)                       LVIDs      2.7 (2.0-3.5)

     %FS          39%


LVPWd             1.5 (0.6-1.2)


Ao Diam           2.5 (2.0-3.7)





2 DIMENSIONAL ASSESSMENT:











RIGHT ATRIUM:                   DILATED LEFT ATRIUM:       DILATED


 


RIGHT VENTRICLE:            DILATED LEFT VENTRICLE: NORMAL


 


TRICUSPID VALVE:             NORMAL MITRAL VALVE:     NORMAL


 


PULMONIC VALVE:             NORMAL AORTIC VALVE:     SCLEROSIS


 


PERICARDIAL EFFUSION: NONE AORTIC ROOT:      NORMAL








LEFT VENTRICULAR WALL MOTION:     NORMAL.


DOPPLER/COLOR FLOW:     NO AORTIC STENOSIS OR AORTIC REGURGITATION. MILD MITRAL 

REGURGITATION. MODERATE TRICUSPID REGURGITATION. SEVERE PULMONARY HYPERTENSION. 

ESTIMATED RIGHT VENTRICULAR SYSTOLIC PRESSURE 66mmHg. 


COMMENTS:      NORMAL LEFT VENTRICULAR EJECTION FRACTION. AORTIC SCLEROSIS WITH 

NO AORTIC STENOSIS/ AORTIC REGURGITATION. DILATED LEFT ATRIUM, RIGHT ATRIUM AND 

RIGHT VENTRICLE. MILD MITRAL REGURGITATION. MODERATE TRICUSPID REGURGITATION. 

SEVERE PULMONARY HYPERTENSION.





Conclusions/Impression: 


A/


FELIZ likely CRS.


CKD IV with proteinuria.


A/C Diastolic CHF.


Pulmonary HTN.


DM II with CKD.


HTN with CKD.


Anemia in chronic illness.


BPH with LUTS.


Moderate protein malnutrition.


Hypocalcemia.





P/ Continue current POC and Medications.


Continue diuretics.


Give potassium today.


No NSAIDs.


AM labs.  Daily weight.





Follow up with Dr. Hewitt in the nephrology clinic.

## 2019-02-09 NOTE — DS
Date of Discharge:  02/08/2019



Consultants:  

1.Dr. Alfonso, Cardiology.

2.Dr. Frazier with Nephrology.



Procedures:  None.



Admitting Diagnoses:  

1.Acute-on-chronic systolic heart failure.

2.Diabetes mellitus type 2 with long-term use of insulin.

3.Chronic obstructive pulmonary disease, chronic bronchitis.

4.Chronic respiratory failure, on home oxygen.

5.Essential hypertension.

6.Coronary artery disease, native artery and native heart, without angina.

7.Obesity, body mass index 38.9.



Discharge Diagnoses:  

1.Acute-on-chronic diastolic heart failure, ejection fraction 69%.

2.Severe pulmonary hypertension.

3.Acute-on-chronic kidney disease stage 3.

4.Diabetes mellitus type 2 with long-term use of insulin with hyperglycemia.

5.Chronic obstructive pulmonary disease, chronic bronchitis.

6.Chronic respiratory failure secondary to chronic obstructive pulmonary disease and congestive hear
t failure, on home O2 at 3 L.

7.Morbid obesity.

8.Essential hypertension.

9.Gout.

10.Coronary artery disease, native artery and native heart, without angina.

11.Noncompliance.

12.Atrial fibrillation on Eliquis, controlled ventricular rate.

13.Rheumatoid arthritis.



Hospital Course:  The patient is an 79-year-old male who [facility], [facility] to [facility] with worsening shortness of breath.  The patient was recently at [facility] and left AMA.  The patien
t was found to have CHF exacerbation.  He was started on IV diuretics.  Echocardiogram was obtained. 
 His ejection fraction was in his 60s.  He did have some severe pulmonary hypertension.  Cardiology a
nd Nephrology were consulted.  The patient was diuresed and had negative fluid balance.  His symptoms
 improved significantly.  The patient was counseled regarding fluid-restricted and sodium-restricted 
diet.  The patient's creatinine also improved.  He does take 900 mg of gabapentin twice a day.  Dose 
was decreased to 300 twice a day.  He was counseled regarding use of NSAIDs to avoid NSAIDs altogethe
r.  Overall, the patient did well during the hospital course.  The patient had significant improvemen
t in his symptoms.  He will need to follow up [facility] in 2 to 3 day
s; follow up with cardiologist, Dr. Alfonso, in 2 weeks; follow up with nephrologist, Dr. Hewitt, in
 2 weeks; establish care with pulmonologist for treatment of severe pulmonary hypertension; and retur
n to ER for worsening condition.



Diet:  Low-sodium, fluid-restricted diet.



Activity:  As tolerated.



Medications:  As per medication reconciliation list.



Physical Examination:

General:  Awake, alert, oriented x3.  Elderly male, morbidly obese. 

CV:  S1 and S2.  No murmurs. 

Respiratory:  Moving air well bilaterally. 

Gastrointestinal:  Abdomen is soft, nontender, nondistended.  Positive bowel sounds. 

Extremities:  No clubbing or cyanosis; 2+ edema in bilateral lower extremities. 

Neurologic:  Nonfocal. 



Total time spent discharging the patient was 37 minutes.





/MODL

DD:  02/08/2019 13:24:09Voice ID:  [id number]

DT:  02/09/2019 03:27:16Report ID:  [id number]

## 2019-03-14 ENCOUNTER — HOSPITAL ENCOUNTER (OUTPATIENT)
Dept: HOSPITAL 97 - ER | Age: 79
Setting detail: OBSERVATION
LOS: 2 days | Discharge: HOME | End: 2019-03-16
Attending: HOSPITALIST | Admitting: HOSPITALIST
Payer: COMMERCIAL

## 2019-03-14 VITALS — BODY MASS INDEX: 36.5 KG/M2

## 2019-03-14 DIAGNOSIS — G89.29: ICD-10-CM

## 2019-03-14 DIAGNOSIS — I25.10: ICD-10-CM

## 2019-03-14 DIAGNOSIS — E87.5: Primary | ICD-10-CM

## 2019-03-14 DIAGNOSIS — I50.32: ICD-10-CM

## 2019-03-14 DIAGNOSIS — I48.2: ICD-10-CM

## 2019-03-14 DIAGNOSIS — M06.9: ICD-10-CM

## 2019-03-14 DIAGNOSIS — Z79.01: ICD-10-CM

## 2019-03-14 DIAGNOSIS — N18.4: ICD-10-CM

## 2019-03-14 DIAGNOSIS — E11.22: ICD-10-CM

## 2019-03-14 DIAGNOSIS — J44.9: ICD-10-CM

## 2019-03-14 DIAGNOSIS — I13.0: ICD-10-CM

## 2019-03-14 LAB
ANISOCYTOSIS BLD QL: (no result)
BUN BLD-MCNC: 99 MG/DL (ref 7–18)
GLUCOSE SERPLBLD-MCNC: 203 MG/DL (ref 74–106)
HCT VFR BLD CALC: 41.5 % (ref 39.6–49)
LYMPHOCYTES # SPEC AUTO: 3.6 K/UL (ref 0.7–4.9)
MORPHOLOGY BLD-IMP: (no result)
PMV BLD: 9.3 FL (ref 7.6–11.3)
POTASSIUM SERPL-SCNC: 6.3 MMOL/L (ref 3.5–5.1)
RBC # BLD: 4.81 M/UL (ref 4.33–5.43)

## 2019-03-14 PROCEDURE — 80053 COMPREHEN METABOLIC PANEL: CPT

## 2019-03-14 PROCEDURE — 87340 HEPATITIS B SURFACE AG IA: CPT

## 2019-03-14 PROCEDURE — 99285 EMERGENCY DEPT VISIT HI MDM: CPT

## 2019-03-14 PROCEDURE — 96365 THER/PROPH/DIAG IV INF INIT: CPT

## 2019-03-14 PROCEDURE — 81001 URINALYSIS AUTO W/SCOPE: CPT

## 2019-03-14 PROCEDURE — 86704 HEP B CORE ANTIBODY TOTAL: CPT

## 2019-03-14 PROCEDURE — 81003 URINALYSIS AUTO W/O SCOPE: CPT

## 2019-03-14 PROCEDURE — 93005 ELECTROCARDIOGRAM TRACING: CPT

## 2019-03-14 PROCEDURE — 84100 ASSAY OF PHOSPHORUS: CPT

## 2019-03-14 PROCEDURE — 84550 ASSAY OF BLOOD/URIC ACID: CPT

## 2019-03-14 PROCEDURE — 82570 ASSAY OF URINE CREATININE: CPT

## 2019-03-14 PROCEDURE — 83735 ASSAY OF MAGNESIUM: CPT

## 2019-03-14 PROCEDURE — 86317 IMMUNOASSAY INFECTIOUS AGENT: CPT

## 2019-03-14 PROCEDURE — 96375 TX/PRO/DX INJ NEW DRUG ADDON: CPT

## 2019-03-14 PROCEDURE — 85025 COMPLETE CBC W/AUTO DIFF WBC: CPT

## 2019-03-14 PROCEDURE — 80048 BASIC METABOLIC PNL TOTAL CA: CPT

## 2019-03-14 PROCEDURE — 83036 HEMOGLOBIN GLYCOSYLATED A1C: CPT

## 2019-03-14 PROCEDURE — 36415 COLL VENOUS BLD VENIPUNCTURE: CPT

## 2019-03-14 PROCEDURE — 82043 UR ALBUMIN QUANTITATIVE: CPT

## 2019-03-14 PROCEDURE — 83880 ASSAY OF NATRIURETIC PEPTIDE: CPT

## 2019-03-14 PROCEDURE — 86803 HEPATITIS C AB TEST: CPT

## 2019-03-14 PROCEDURE — 81015 MICROSCOPIC EXAM OF URINE: CPT

## 2019-03-14 PROCEDURE — 82962 GLUCOSE BLOOD TEST: CPT

## 2019-03-14 PROCEDURE — 87493 C DIFF AMPLIFIED PROBE: CPT

## 2019-03-14 NOTE — EDPHYS
Physician Documentation                                                                           

 [facility]                                                                

Name: Ken Gloria                                                                                

Age: 79 yrs                                                                                       

Sex: Male                                                                                         

: 1940                                                                                   

MRN: [id number]                                                                                   

Arrival Date: 2019                                                                          

Time: 20:42                                                                                       

Account#: [id number]                                                                            

Bed 8                                                                                             

Private MD:                                                                                       

ED Physician Harry Hammond                                                                       

HPI:                                                                                              

                                                                                             

22:05 This 79 yrs old  Male presents to ER via Wheelchair with complaints of high K+.kb  

22:05 [facility] Was called and told to come to the nearest ER   kb  

      for very high potassium. Denies muscle cramps or palpitations. Onset: The                   

      symptoms/episode began/occurred today. The patient has not experienced similar symptoms     

      in the past. The patient has not recently seen a physician. Pt was started on               

      spirinolactone a month ago.                                                                 

                                                                                                  

Historical:                                                                                       

- Allergies:                                                                                      

21:30 No Known Allergies;                                                                     ak1 

- Home Meds:                                                                                      

21:30 Eliquis oral oral [Active]; simvastatin 10 mg Oral tab 1 tab once daily [Active];       ak1 

      bumetanide 2 mg Oral tab 1 tab 2 times per day [Active]; Ferrous Sulfate Oral [Active];     

      gabapentin 300 mg oral cap 1 cap 2 times daily [Active]; Levemir FlexTouch subcutaneous     

      subcutaneous [Active]; insulin [Active]; leflunomide 10 mg oral tab 1 tab once daily        

      [Active]; Metoprolol Tartrate Oral [Active]; prednisone 5 mg/5 mL Oral soln once daily      

      [Active]; tamsulosin 0.4 mg oral cp24 1 cap once daily [Active]; tramadol 50 mg Oral        

      tab 1 tab every 6 hours [Active]; Spironolactone Oral [Active]; finasteride oral oral       

      [Active];                                                                                   

- PMHx:                                                                                           

21:30 a-fib; Diabetes - IDDM; CHF; Myocardial infarction; Rheumatoid Arthritis;               ak1 

- PSHx:                                                                                           

21:30 bilateral knees; kidney;                                                                ak1 

                                                                                                  

- Immunization history:: Adult Immunizations not up to date.                                      

- Social history:: Smoking status: Patient/guardian denies using tobacco.                         

- Ebola Screening: : No symptoms or risks identified at this time.                                

                                                                                                  

                                                                                                  

ROS:                                                                                              

22:04 Constitutional: Negative for fever, chills, and weight loss, Cardiovascular: Negative   kb  

      for chest pain, palpitations, and edema, Respiratory: Negative for shortness of breath,     

      cough, wheezing, and pleuritic chest pain, Abdomen/GI: Negative for abdominal pain,         

      nausea, vomiting, diarrhea, and constipation, Back: Negative for injury and pain,           

      MS/Extremity: Negative for injury and deformity, Skin: Negative for injury, rash, and       

      discoloration, Neuro: Negative for headache, weakness, numbness, tingling, and seizure.     

                                                                                                  

Exam:                                                                                             

22:04 Constitutional:  This is a well developed, well nourished patient who is awake, alert,  kb  

      and in no acute distress. Head/Face:  Normocephalic, atraumatic. ENT:  Nares patent. No     

      nasal discharge, no septal abnormalities noted.  Tympanic membranes are normal and          

      external auditory canals are clear.  Oropharynx with no redness, swelling, or masses,       

      exudates, or evidence of obstruction, uvula midline.  Mucous membranes moist. Neck:         

      Trachea midline, no thyromegaly or masses palpated, and no cervical lymphadenopathy.        

      Supple, full range of motion without nuchal rigidity, or vertebral point tenderness.        

      No Meningismus. Chest/axilla:  Normal chest wall appearance and motion.  Nontender with     

      no deformity.  No lesions are appreciated. Cardiovascular:  Regular rate and rhythm         

      with a normal S1 and S2.  No gallops, murmurs, or rubs.  Normal PMI, no JVD.  No pulse      

      deficits. Respiratory:  Lungs have equal breath sounds bilaterally, clear to                

      auscultation and percussion.  No rales, rhonchi or wheezes noted.  No increased work of     

      breathing, no retractions or nasal flaring. Abdomen/GI:  Soft, non-tender, with normal      

      bowel sounds.  No distension or tympany.  No guarding or rebound.  No evidence of           

      tenderness throughout. Skin:  Warm, dry with normal turgor.  Normal color with no           

      rashes, no lesions, and no evidence of cellulitis. MS/ Extremity:  Pulses equal, no         

      cyanosis.  Neurovascular intact.  Full, normal range of motion. Neuro:  Awake and           

      alert, GCS 15, oriented to person, place, time, and situation.  Cranial nerves II-XII       

      grossly intact.  Motor strength 5/5 in all extremities.  Sensory grossly intact.            

      Cerebellar exam normal.  Normal gait.                                                       

                                                                                                  

Vital Signs:                                                                                      

21:30  / 85; Pulse 95; Resp 18; Temp 97.6(O); Pulse Ox 98% on R/A; Weight 105.69 kg     ak1 

      (R); Height 5 ft. 6 in. (167.64 cm) (R); Pain 0/10;                                         

22:25  / 82; Pulse 98; Resp 18; Temp 97.6; Pulse Ox 98% on R/A;                         ak1 

21:30 Body Mass Index 37.61 (105.69 kg, 167.64 cm)                                            ak1 

                                                                                                  

MDM:                                                                                              

21:16 Patient medically screened.                                                             kb  

22:04 Data reviewed: vital signs, nurses notes. Data interpreted: Pulse oximetry: on room air kb  

      is 98 %. Interpretation: normal.                                                            

22:47 Counseling: I had a detailed discussion with the patient and/or guardian regarding: the kb  

      historical points, exam findings, and any diagnostic results supporting the                 

      discharge/admit diagnosis, lab results, the need for further work-up and treatment in       

      the hospital.                                                                               

23:16 Physician consultation: Arun Carnes MD was contacted at 23:16, regarding admission,   kb  

      to the telemetry unit. patient's condition, and will see patient in ED, shortly.            

                                                                                                  

                                                                                             

21:38 Order name: CBC with Diff; Complete Time: 22:39                                         kb  

                                                                                             

21:38 Order name: Basic Metabolic Panel; Complete Time: 22:39                                 kb  

                                                                                             

22:18 Order name: Manual Differential; Complete Time: 22:39                                   EDMS

                                                                                             

23:39 Order name: CBC with Automated Diff                                                     EDMS

                                                                                             

23:39 Order name: CBC with Automated Diff                                                     EDMS

                                                                                             

23:39 Order name: Comprehensive Metabolic Panel                                               EDMS

                                                                                             

21:38 Order name: EKG; Complete Time: 21:38                                                   kb  

                                                                                             

23:39 Order name: CONS Pharmacy Consult                                                       EDMS

                                                                                             

23:39 Order name: Comprehensive Metabolic Panel                                               EDMS

                                                                                             

23:40 Order name: Comprehensive Metabolic Panel                                               EDMS

                                                                                             

21:38 Order name: IV Start; Complete Time: 22:09                                              kb  

                                                                                             

21:38 Order name: EKG - Nurse/Tech; Complete Time: 21:50                                      kb  

                                                                                             

23:39 Order name: Heart Healthy                                                               EDMS

                                                                                                  

Administered Medications:                                                                         

23:17 Drug: Insulin Regular Human 10 units {Co-Signature: lp1 (Keli Salcedo RN).} Route: IVP;   ak1 

      Site: right antecubital;                                                                    

23:59 Follow up: Response: No adverse reaction                                                ak1 

23:17 Drug: D50W 50 ml Route: IVP; Site: right antecubital;                                   ak1 

23:59 Follow up: Response: No adverse reaction                                                ak1 

23:17 Drug: Calcium Gluconate 1 grams Route: IVPB; Infused Over: 60 mins; Site: right         ak1 

      antecubital;                                                                                

03/15                                                                                             

00:41 Follow up: IV Status: Completed infusion                                                ak1 

                                                                                                  

                                                                                                  

Disposition:                                                                                      

19 23:17 Hospitalization ordered by Arun Carnes for Observation. Preliminary             

  diagnosis are Acute kidney failure - acute on chronic, Hyperkalemia.                            

- Bed requested for Telemetry/MedSurg (observation).                                              

- Status is Observation.                                                                      ak1 

- Condition is Stable.                                                                            

- Problem is new.                                                                                 

- Symptoms are unchanged.                                                                         

UTI on Admission? No                                                                              

                                                                                                  

                                                                                                  

                                                                                                  

Addendum:                                                                                         

2019                                                                                        

     19:35 Co-signature as Attending Physician, Harry Hammond MD.                                  g
s

                                                                                                  

Signatures:                                                                                       

Dispatcher [employer]                           [facility]                                                 

Sabrina Urban, LOREP-C                 FNP-Ckb                                                   

Kyra Dominguez RN                        NATHALIE                                                      

Falguni Valdez RN                       RN   [facility]                                                  

Harry Hammond MD MD                                                      

Keli Salcedo RN                                1                                                  

                                                                                                  

Corrections: (The following items were deleted from the chart)                                    

                                                                                             

23:42 23:17 Hospitalization Ordered by Arun Carnes MD for Observation. Preliminary            

      diagnosis is Acute kidney failure - acute on chronic; Hyperkalemia. Bed requested for       

      Telemetry/MedSurg (observation). Status is Observation. Condition is Stable. Problem is     

      new. Symptoms are unchanged. UTI on Admission? No.                                        

23:49 23:39 Basic Metabolic Panel ordered. [facility]

03/15                                                                                             

00:42  23:42 2019 23:17 Hospitalization Ordered by Arun Carnes MD for            ak1 

      Observation. Preliminary diagnosis is Acute kidney failure - acute on chronic;              

      Hyperkalemia. Bed requested for Telemetry/MedSurg (observation). Status is Observation.     

      Condition is Stable. Problem is new. Symptoms are unchanged. UTI on Admission? No. mw       

                                                                                                  

**************************************************************************************************

## 2019-03-14 NOTE — XMS REPORT
Patient Summary Document

 Created on:2019



Patient:MEL BRADFORD

Sex:Male

:1940

External Reference #:[id number]





Demographics







 Address  [address] [place] [address]

 

 Home Phone  [phone]

 

 Email Address  NONE

 

 Preferred Language  Unknown

 

 Marital Status  Unknown

 

 Alevism Affiliation  Unknown

 

 Race  Unknown

 

 Additional Race(s)  Unavailable

 

 Ethnic Group  Unknown









Author







 Organization  [facility]

 

 Address  [address] [place] [address] [place] [id number]

 

 Phone  [phone]









Care Team Providers







 Name  Role  Phone

 

 Unavailable  Unavailable  Unavailable









Problems

This patient has no known problems.



Allergies, Adverse Reactions, Alerts

This patient has no known allergies or adverse reactions.



Medications

This patient has no known medications.

## 2019-03-14 NOTE — ER
Nurse's Notes                                                                                     

 [facility]                                                                

Name: Ken Gloria                                                                                

Age: 79 yrs                                                                                       

Sex: Male                                                                                         

: 1940                                                                                   

MRN: [id number]                                                                                   

Arrival Date: 2019                                                                          

Time: 20:42                                                                                       

Account#: [id number]                                                                            

Bed 8                                                                                             

Private MD:                                                                                       

Diagnosis: Acute kidney failure-acute on chronic;Hyperkalemia                                     

                                                                                                  

Presentation:                                                                                     

                                                                                             

21:21 Presenting complaint: [facility] 

       stating pt K+ "is very high". Transition of care: patient was not received from        

      another setting of care. Onset of symptoms was 2019. Risk Assessment: Do you      

      want to hurt yourself or someone else? Patient reports no desire to harm self or            

      others. Initial Sepsis Screen: Does the patient meet any 2 criteria? No. Patient's          

      initial sepsis screen is negative. Does the patient have a suspected source of              

      infection? No. Patient's initial sepsis screen is negative. Note pt sees Dr. Walker        

      for cardiology. Dr. Smith for ESRD. Care prior to arrival: None.                          

21:21 Method Of Arrival: Wheelchair                                                           ak 

21:21 Acuity: DEWAYNE 3                                                                           ak1 

                                                                                                  

Triage Assessment:                                                                                

21:30 General: Appears in no apparent distress. Behavior is calm, cooperative. Pain: Denies   ak1 

      pain. EENT: No signs and/or symptoms were reported regarding the EENT system. Neuro: No     

      deficits noted. Cardiovascular: No deficits noted. Respiratory: No deficits noted. GI:      

      No signs and/or symptoms were reported involving the gastrointestinal system. : No        

      signs and/or symptoms were reported regarding the genitourinary system. Derm: No signs      

      and/or symptoms reported regarding the dermatologic system. Musculoskeletal: No signs       

      and/or symptoms reported regarding the musculoskeletal system.                              

                                                                                                  

Historical:                                                                                       

- Allergies:                                                                                      

21:30 No Known Allergies;                                                                     ak1 

- Home Meds:                                                                                      

21:30 Eliquis oral oral [Active]; simvastatin 10 mg Oral tab 1 tab once daily [Active];       ak1 

      bumetanide 2 mg Oral tab 1 tab 2 times per day [Active]; Ferrous Sulfate Oral [Active];     

      gabapentin 300 mg oral cap 1 cap 2 times daily [Active]; Levemir FlexTouch subcutaneous     

      subcutaneous [Active]; insulin [Active]; leflunomide 10 mg oral tab 1 tab once daily        

      [Active]; Metoprolol Tartrate Oral [Active]; prednisone 5 mg/5 mL Oral soln once daily      

      [Active]; tamsulosin 0.4 mg oral cp24 1 cap once daily [Active]; tramadol 50 mg Oral        

      tab 1 tab every 6 hours [Active]; Spironolactone Oral [Active]; finasteride oral oral       

      [Active];                                                                                   

- PMHx:                                                                                           

21:30 a-fib; Diabetes - IDDM; CHF; Myocardial infarction; Rheumatoid Arthritis;               ak1 

- PSHx:                                                                                           

21:30 bilateral knees; kidney;                                                                ak1 

                                                                                                  

- Immunization history:: Adult Immunizations not up to date.                                      

- Social history:: Smoking status: Patient/guardian denies using tobacco.                         

- Ebola Screening: : No symptoms or risks identified at this time.                                

                                                                                                  

                                                                                                  

Screenin:32 Abuse screen: Denies threats or abuse. Denies injuries from another. Nutritional        ak1 

      screening: No deficits noted. Tuberculosis screening: No symptoms or risk factors           

      identified. Fall Risk None identified.                                                      

                                                                                                  

Assessment:                                                                                       

21:33 Reassessment: Patient appears in no apparent distress at this time. No changes from     ak1 

      previously documented assessment. see triage assessment.                                    

                                                                                                  

Vital Signs:                                                                                      

21:30  / 85; Pulse 95; Resp 18; Temp 97.6(O); Pulse Ox 98% on R/A; Weight 105.69 kg     ak1 

      (R); Height 5 ft. 6 in. (167.64 cm) (R); Pain 0/10;                                         

22:25  / 82; Pulse 98; Resp 18; Temp 97.6; Pulse Ox 98% on R/A;                         ak1 

21:30 Body Mass Index 37.61 (105.69 kg, 167.64 cm)                                            ak1 

                                                                                                  

ED Course:                                                                                        

20:42 Patient arrived in ED.                                                                  es  

21:13 Sabrina Urban FNP-C is PHCP.                                                        kb  

21:13 Harry Hammond MD is Attending Physician.                                              kb  

21:21 Falguni Valdez, RN is Primary Nurse.                                                     ak1 

21:23 Triage completed.                                                                       ak1 

21:30 Arm band placed on Patient placed in an exam room, on a stretcher, on pulse oximetry,   ak1 

      Patient notified of wait time.                                                              

21:32 Patient has correct armband on for positive identification. Bed in low position. Call   ak1 

      light in reach. Side rails up X 1. Pulse ox on. NIBP on.                                    

22:08 Inserted saline lock: 20 gauge in right antecubital area, using aseptic technique.      oe  

      Blood collected.                                                                            

22:14 No provider procedures requiring assistance completed.                                  ak1 

23:16 Arun Carnes MD is Hospitalizing Provider.                                           kb  

23:57 Patient admitted, IV remains in place.                                                  ak1 

                                                                                                  

Administered Medications:                                                                         

23:17 Drug: Insulin Regular Human 10 units {Co-Signature: valerie1 (Keli Salcedo RN).} Route: IVP;   ak1 

      Site: right antecubital;                                                                    

23:59 Follow up: Response: No adverse reaction                                                ak1 

23:17 Drug: D50W 50 ml Route: IVP; Site: right antecubital;                                   ak1 

23:59 Follow up: Response: No adverse reaction                                                ak1 

23:17 Drug: Calcium Gluconate 1 grams Route: IVPB; Infused Over: 60 mins; Site: right         ak1 

      antecubital;                                                                                

03/15                                                                                             

00:41 Follow up: IV Status: Completed infusion                                                ak1 

                                                                                                  

                                                                                                  

Outcome:                                                                                          

                                                                                             

23:17 Decision to Hospitalize by Provider.                                                    kb  

23:18 Condition: good                                                                         ak1 

23:18 Instructed on the need for admit.                                                           

23:57 Admitted to Tele accompanied by tech, family with patient, via wheelchair, with oxygen, ak1 

      with chart.                                                                                 

03/15                                                                                             

00:42 Patient left the ED.                                                                    ak1 

                                                                                                  

Signatures:                                                                                       

Sabrina Urban FNP-C FNP-Alayna Chang Amber, RN                       RN   ak1                                                  

Les Cox                            oe                                                   

Keli Salcedo RN                                lp1                                                  

                                                                                                  

**************************************************************************************************

## 2019-03-15 LAB
ALBUMIN SERPL BCP-MCNC: 3.3 G/DL (ref 3.4–5)
ALBUMIN SERPL BCP-MCNC: 3.5 G/DL (ref 3.4–5)
ALP SERPL-CCNC: 103 U/L (ref 45–117)
ALP SERPL-CCNC: 111 U/L (ref 45–117)
ALT SERPL W P-5'-P-CCNC: 26 U/L (ref 12–78)
ALT SERPL W P-5'-P-CCNC: 30 U/L (ref 12–78)
AST SERPL W P-5'-P-CCNC: 12 U/L (ref 15–37)
AST SERPL W P-5'-P-CCNC: 13 U/L (ref 15–37)
BUN BLD-MCNC: 93 MG/DL (ref 7–18)
BUN BLD-MCNC: 99 MG/DL (ref 7–18)
GLUCOSE SERPLBLD-MCNC: 163 MG/DL (ref 74–106)
GLUCOSE SERPLBLD-MCNC: 56 MG/DL (ref 74–106)
HCT VFR BLD CALC: 40.4 % (ref 39.6–49)
LYMPHOCYTES # SPEC AUTO: 4.3 K/UL (ref 0.7–4.9)
PMV BLD: 9.3 FL (ref 7.6–11.3)
POTASSIUM SERPL-SCNC: 5.9 MMOL/L (ref 3.5–5.1)
POTASSIUM SERPL-SCNC: 6.2 MMOL/L (ref 3.5–5.1)
RBC # BLD: 4.68 M/UL (ref 4.33–5.43)
UA COMPLETE W REFLEX CULTURE PNL UR: (no result)
UA DIPSTICK W REFLEX MICRO PNL UR: (no result)

## 2019-03-15 RX ADMIN — IRON SUPPLEMENT SCH MG: 325 TABLET ORAL at 22:12

## 2019-03-15 RX ADMIN — APIXABAN SCH MG: 2.5 TABLET, FILM COATED ORAL at 22:13

## 2019-03-15 RX ADMIN — Medication SCH ML: at 22:14

## 2019-03-15 RX ADMIN — INSULIN GLARGINE SCH UNITS: 100 INJECTION, SOLUTION SUBCUTANEOUS at 22:12

## 2019-03-15 RX ADMIN — GABAPENTIN SCH MG: 300 CAPSULE ORAL at 09:02

## 2019-03-15 RX ADMIN — ATORVASTATIN CALCIUM SCH: 10 TABLET, FILM COATED ORAL at 21:00

## 2019-03-15 RX ADMIN — ALBUMIN (HUMAN) SCH MG: 5 SOLUTION INTRAVENOUS at 09:03

## 2019-03-15 RX ADMIN — SODIUM CHLORIDE SCH MLS: 0.9 INJECTION, SOLUTION INTRAVENOUS at 02:04

## 2019-03-15 RX ADMIN — GABAPENTIN SCH MG: 300 CAPSULE ORAL at 22:13

## 2019-03-15 RX ADMIN — HUMAN INSULIN SCH: 100 INJECTION, SOLUTION SUBCUTANEOUS at 16:17

## 2019-03-15 RX ADMIN — APIXABAN SCH MG: 2.5 TABLET, FILM COATED ORAL at 09:01

## 2019-03-15 RX ADMIN — ALBUMIN (HUMAN) SCH MG: 5 SOLUTION INTRAVENOUS at 02:06

## 2019-03-15 RX ADMIN — Medication SCH ML: at 09:04

## 2019-03-15 RX ADMIN — HUMAN INSULIN SCH UNIT: 100 INJECTION, SOLUTION SUBCUTANEOUS at 22:11

## 2019-03-15 RX ADMIN — INSULIN GLARGINE SCH UNITS: 100 INJECTION, SOLUTION SUBCUTANEOUS at 09:03

## 2019-03-15 RX ADMIN — ALBUMIN (HUMAN) SCH MG: 5 SOLUTION INTRAVENOUS at 17:16

## 2019-03-15 RX ADMIN — SODIUM CHLORIDE SCH MLS: 0.9 INJECTION, SOLUTION INTRAVENOUS at 13:58

## 2019-03-15 RX ADMIN — METOPROLOL TARTRATE SCH MG: 25 TABLET ORAL at 17:16

## 2019-03-15 RX ADMIN — Medication SCH: at 09:00

## 2019-03-15 RX ADMIN — TAMSULOSIN HYDROCHLORIDE SCH MG: 0.4 CAPSULE ORAL at 09:02

## 2019-03-15 RX ADMIN — HUMAN INSULIN SCH UNIT: 100 INJECTION, SOLUTION SUBCUTANEOUS at 11:42

## 2019-03-15 RX ADMIN — ATORVASTATIN CALCIUM SCH MG: 10 TABLET, FILM COATED ORAL at 22:12

## 2019-03-15 RX ADMIN — IRON SUPPLEMENT SCH MG: 325 TABLET ORAL at 09:01

## 2019-03-15 NOTE — EKG
Test Date:    2019-03-14               Test Time:    21:23:07

Technician:   ANABELLE                                     

                                                     

MEASUREMENT RESULTS:                                       

Intervals:                                           

Rate:         97                                     

IA:                                                  

QRSD:         92                                     

QT:           344                                    

QTc:          436                                    

Axis:                                                

P:                                                   

IA:                                                  

QRS:          48                                     

T:            101                                    

                                                     

INTERPRETIVE STATEMENTS:                                       

                                                     

Atrial fibrillation

Abnormal ECG

Compared to ECG 01/05/2019 17:32:01

ST (T wave) deviation no longer present



Electronically Signed On 03-15-19 05:31:09 CDT by Pedro Alfonso

## 2019-03-15 NOTE — P.CNS
Date of Consult: 03/15/19


Reason for Consult: Hyperkalemia


Requesting Physician: Arun Carnes


Chief Complaint: Hyperkalemia


History of Present Illness: 


78 yo WM CKD, DM presented to the ER with severe hyperkalemia in the setting of 

CKD and Hyperkalemia.  Reports eating bananas, nuts and dairy regularly.  

Started spironolactone one month ago with an improvement in his edema.  States 

that he is feeling well.  No alleviating fx.


Follows with Dr. Hewitt in the clinic.





22:05 This 79 yrs old  Male presents to ER via Wheelchair with 

complaints of high K+.kb  


22:05 [facility] and told to come to 

the nearest ER   kb  


      for very high potassium. Denies muscle cramps or palpitations. Onset: The

                   


      symptoms/episode began/occurred today. The patient has not experienced 

similar symptoms     


      in the past. The patient has not recently seen a physician. Pt was 

started on               


      spirinolactone a month ago.                                              

                   


Allergies





No Known Allergies Allergy (Unverified 02/05/19 20:33)


 





Home medications list reviewed: Yes


Home Medications: 








Apixaban [Eliquis *] 2.5 mg PO BID 02/05/19 


Bumetanide [Bumex] 2 mg PO BID 02/05/19 


Ferrous Sulfate 325 mg PO BID 02/05/19 


Insulin Detemir [Levemir] 16 unit SQ BID 02/05/19 


Simvastatin 10 mg PO DAILY 02/05/19 


Tamsulosin HCl 0.4 mg PO DAILY 02/05/19 


predniSONE [Prednisone*] 5 mg PO DAILY 02/05/19 


traMADol HCL [Ultram*] 50 mg PO BIDP PRN 02/05/19 


Gabapentin 300 mg PO BID #60 capsule 02/08/19 


Metoprolol Tartrate [Lopressor*] 25 mg PO BID 6AM 6PM #60 tab 02/08/19 


Spironolactone [Aldactone] 50 mg PO BID #60 tablet 02/08/19 


Leflunomide 10 mg PO DAILY 03/15/19 








- Past Medical/Surgical History


Diabetic: Yes


-: IDDM


-: CHF


-: Afib


-: MI


-: Rheumatoid Arthritis


-: Chronic Kidney Disease


-: Knees bilaterally


-: kidney





- Family History


  ** Sister


Medical History: Hypertension, Diabetes, Cancer





- Social History


Alcohol use: No


CD- Drugs: No


Caffeine use: Yes


Place of Residence: Home





Review of Systems


10-point ROS is otherwise unremarkable


Respiratory: SOB with Excertion


Cardiovascular: Edema





Physical Examination














Temp Pulse Resp BP Pulse Ox


 


 96.9 F   94 H  16   147/85 H  96 


 


 03/15/19 12:00  03/15/19 12:00  03/15/19 12:00  03/15/19 12:00  03/15/19 12:00








General: Oriented x3, Cooperative


Neck: Supple


Respiratory: Clear to auscultation bilaterally, Normal air movement


Cardiovascular: Regular rate/rhythm, Edema


Gastrointestinal: Soft and benign, Non-distended, No guarding


Musculoskeletal: No clubbing, No contractures


Integumentary: No rashes, No cyanosis


Neurological: Normal speech


Laboratory Data (last 24 hrs)





03/14/19 22:05: Sodium 135 L, Potassium 6.3 H*, BUN 99 H, Creatinine 3.31 H, 

Glucose 203 H


03/14/19 22:05: WBC 8.2, Hgb 13.3 L, Hct 41.5, Plt Count 191





Conclusions/Impression: 





A/


Hyperkalemia in the setting of spironolactone and a high potassium diet.


CKD IV with proteinuria.


DM II with CKD.


HTN with CKD/ CHF.


Diastolic CHF, chronic.  Moderate MR.  Pulmonary HTN.


Hypocalcemia.








P/ Continue current POC and Medications.


Hold spironolactone.


Continue furosemide.


Gentle IVF.  Monitor volume status.


Give Kayexalate.


Change to a Renal Diet.


No NSAIDs.


AM labs.  Daily weight.





Thank you kindly for the consultation.

## 2019-03-16 VITALS — OXYGEN SATURATION: 98 %

## 2019-03-16 VITALS — SYSTOLIC BLOOD PRESSURE: 114 MMHG | DIASTOLIC BLOOD PRESSURE: 69 MMHG | TEMPERATURE: 97.1 F

## 2019-03-16 LAB
BUN BLD-MCNC: 77 MG/DL (ref 7–18)
BUN BLD-MCNC: 83 MG/DL (ref 7–18)
CREAT UR-SCNC: 30 MG/DL (ref 20–370)
GLUCOSE SERPLBLD-MCNC: 112 MG/DL (ref 74–106)
GLUCOSE SERPLBLD-MCNC: 256 MG/DL (ref 74–106)
HCT VFR BLD CALC: 39.9 % (ref 39.6–49)
LYMPHOCYTES # SPEC AUTO: 3.6 K/UL (ref 0.7–4.9)
MAGNESIUM SERPL-MCNC: 2 MG/DL (ref 1.8–2.4)
MICROALBUMIN UR-MCNC: 64.3 MG/DL (ref ?–1.9)
NT-PROBNP SERPL-MCNC: 4094 PG/ML (ref ?–450)
PMV BLD: 9.3 FL (ref 7.6–11.3)
POTASSIUM SERPL-SCNC: 5.4 MMOL/L (ref 3.5–5.1)
POTASSIUM SERPL-SCNC: 5.7 MMOL/L (ref 3.5–5.1)
RBC # BLD: 4.56 M/UL (ref 4.33–5.43)
UA COMPLETE W REFLEX CULTURE PNL UR: (no result)
URATE SERPL-MCNC: 5.2 MG/DL (ref 3.5–7.2)

## 2019-03-16 RX ADMIN — GABAPENTIN SCH MG: 300 CAPSULE ORAL at 10:29

## 2019-03-16 RX ADMIN — ALBUMIN (HUMAN) SCH MG: 5 SOLUTION INTRAVENOUS at 01:00

## 2019-03-16 RX ADMIN — APIXABAN SCH MG: 2.5 TABLET, FILM COATED ORAL at 09:00

## 2019-03-16 RX ADMIN — IRON SUPPLEMENT SCH MG: 325 TABLET ORAL at 10:30

## 2019-03-16 RX ADMIN — HUMAN INSULIN SCH: 100 INJECTION, SOLUTION SUBCUTANEOUS at 07:30

## 2019-03-16 RX ADMIN — Medication SCH ML: at 09:00

## 2019-03-16 RX ADMIN — Medication SCH: at 09:00

## 2019-03-16 RX ADMIN — HUMAN INSULIN SCH: 100 INJECTION, SOLUTION SUBCUTANEOUS at 11:30

## 2019-03-16 RX ADMIN — ALBUMIN (HUMAN) SCH MG: 5 SOLUTION INTRAVENOUS at 10:31

## 2019-03-16 RX ADMIN — INSULIN GLARGINE SCH UNITS: 100 INJECTION, SOLUTION SUBCUTANEOUS at 09:00

## 2019-03-16 RX ADMIN — METOPROLOL TARTRATE SCH MG: 25 TABLET ORAL at 05:44

## 2019-03-16 RX ADMIN — TAMSULOSIN HYDROCHLORIDE SCH MG: 0.4 CAPSULE ORAL at 10:29

## 2019-03-16 NOTE — P.HP
Certification for Inpatient


Patient admitted to: Observation


With expected LOS: <2 Midnights


Patient will require the following post-hospital care: None


Practitioner: I am a practitioner with admitting privileges, knowledge of 

patient current condition, hospital course, and medical plan of care.


Services: Services provided to patient in accordance with Admission 

requirements found in Title 42 Section 412.3 of the Code of Federal Regulations





Patient History


Date of Service: 03/15/19


Reason for admission: Hyperkalemia


History of Present Illness: 





  Patient is an 79-year-old gentleman who came to the hospital  as he was sent 

in by his primary care provider because he had elevated potassium level.  He 

said he felt fine and did not feel any weakness.  In the ER his potassium was 

greater than 6.  He was admitted to the hospital and treated for his 

hyperkalemia.  He was recently started on Aldactone.  Because of the dosing it 

may have resulted in his hyperkalemia.  He is also on additional medications 

that can cause potassium retention.  And will need to be educated on diet 

because of his chronic kidney disease as well.  He will be admitted to the 

hospital for observation.


Allergies





No Known Allergies Allergy (Unverified 02/05/19 20:33)


 





Home Medications: 








Apixaban [Eliquis *] 2.5 mg PO BID 02/05/19 


Bumetanide [Bumex] 2 mg PO BID 02/05/19 


Ferrous Sulfate 325 mg PO BID 02/05/19 


Insulin Detemir [Levemir] 16 unit SQ BID 02/05/19 


Simvastatin 10 mg PO DAILY 02/05/19 


Tamsulosin HCl 0.4 mg PO DAILY 02/05/19 


predniSONE [Prednisone*] 5 mg PO DAILY 02/05/19 


traMADol HCL [Ultram*] 50 mg PO BIDP PRN 02/05/19 


Gabapentin 300 mg PO BID #60 capsule 02/08/19 


Metoprolol Tartrate [Lopressor*] 25 mg PO BID 6AM 6PM #60 tab 02/08/19 


Leflunomide 5 mg PO BID 03/15/19 








- Past Medical/Surgical History


Has patient received pneumonia vaccine in the past: Yes


Diabetic: Yes


-: IDDM


-: CHF


-: Afib


-: MI


-: Rheumatoid Arthritis


-: Chronic Kidney Disease


-: Knees bilaterally


-: kidney





- Family History


  ** Sister


Medical History: Hypertension, Diabetes, Cancer





- Social History


Alcohol use: No


CD- Drugs: No


Caffeine use: Yes


Place of Residence: Home





Review of Systems


10-point ROS is otherwise unremarkable





Physical Examination





- Vital Signs


Temperature: 97.1 F


Blood Pressure: 114/69


Pulse: 100


Respirations: 18


Pulse Ox (%): 98





- Physical Exam


General: Alert, In no apparent distress, Oriented x3


HEENT: Atraumatic, PERRLA, Mucous membr. moist/pink, EOMI, Sclerae nonicteric


Neck: Supple, 2+ carotid pulse no bruit, No LAD, Without JVD or thyroid 

abnormality


Respiratory: Clear to auscultation bilaterally, Normal air movement


Cardiovascular: Regular rate/rhythm, Normal S1 S2, No murmurs


Gastrointestinal: Normal bowel sounds, Soft and benign, Non-distended, No 

tenderness


Musculoskeletal: No clubbing, No swelling, No tenderness


Integumentary: No rashes


Neurological: Normal gait, Normal speech, Normal strength at 5/5 x4 extr, 

Normal tone, Sensation intact, Cranial nerves 3-12 intact, Normal affect


Lymphatics: No axilla or inguinal lymphadenopathy





Assessment & Plan





- Problems (Diagnosis)


(1) Hyperkalemia


Current Visit: Yes   Status: Acute   





(2) Congestive heart failure


Current Visit: Yes   Status: Acute   





(3) CAD (coronary artery disease)


Onset Date: 02/06/19   Current Visit: No   Status: Acute   


Qualifiers: 


   Coronary Disease-Associated Artery/Lesion type: native artery   Native vs. 

transplanted heart: native heart   Associated angina: without angina   

Qualified Code(s): I25.10 - Atherosclerotic heart disease of native coronary 

artery without angina pectoris   





(4) COPD (chronic obstructive pulmonary disease)


Onset Date: 02/06/19   Current Visit: No   Status: Acute   


Qualifiers: 


   COPD type: unspecified COPD   Qualified Code(s): J44.9 - Chronic obstructive 

pulmonary disease, unspecified   





(5) Diabetes mellitus


Onset Date: 02/06/19   Current Visit: No   Status: Acute   


Qualifiers: 


   Diabetes mellitus type: type 2   Diabetes mellitus long term insulin use: 

with long term use   Diabetes mellitus complication status: with unspecified 

complications   Qualified Code(s): E11.8 - Type 2 diabetes mellitus with 

unspecified complications; Z79.4 - Long term (current) use of insulin   





(6) HTN (hypertension)


Onset Date: 02/06/19   Current Visit: No   Status: Acute   


Qualifiers: 


   Hypertension type: essential hypertension   Qualified Code(s): I10 - 

Essential (primary) hypertension   





- Plan





  Plan:


1. continue monitoring potassium level


2. Gentle hydration


3. nephrology consultation


4. DC Aldactone and hold Bumex today 


5. Kayexalate


6. GI and DVT prophylaxis


Discharge Plan: Home


Plan to discharge in: 48 Hours





- Advance Directives


Does patient have a Living Will: Yes


Does patient have a Durable POA for Healthcare: Yes





- Code Status/Comfort Care


Code Status Assessed: Yes


Code Status: Full Code


Critical Care: No


Time Spent Managing PTS Care (In Minutes): 45

## 2019-03-16 NOTE — PN
Date of Progress Note:  03/16/2019



Subjective:  The patient is seen and examined.  He still feels good.  Denies any other issues.  Overn
ight, volume status has remained stable.  He received some Kayexalate overnight.



Physical Examination:

Vital Signs:  Reviewed and are stable. 

General:  He appears well, in no acute distress. 

Lungs:  Clear. 

Neck:  No JVD was noted. 

Extremities:  With chronic edema, but nothing acute.



Laboratory Data:  Current updated labs are still pending.  Creatinine of 2.81, potassium of 5.4.  His
 creatinine is improving from 3.3 down to 2.8 at this time.  CBC is also showing stable hemoglobin, h
ematocrit, and platelet count.



Medications:  Current medications have been reviewed.



Impression:  

1.Acute on chronic renal insufficiency, likely related to over diuresis.  The patient's spironolacto
ne is currently on hold, which I agree with and his Bumex can be resumed once he goes home with off s
pironolactone.

2.Hyperkalemia, likely related to spironolactone as well.  We will continue to hold it.

3.History of chronic atrial fibrillation, on anticoagulation with Eliquis.

4.Type 2 diabetes, on insulin.

5.Rheumatoid arthritis, on chronic prednisone.

6.Chronic pain, on gabapentin.



Plan:  The patient is doing well.  Potassium is improving.  The patient can be on a low-potassium die
t, which the handout has been given to him.  He will resume his home dose of Bumex, however, will hol
d spironolactone at this time given worsening of renal function as well as hyperkalemia.  We will als
o repeat his labs again on Thursday.  Orders have been given to him and I will follow back with his l
abs and restart the spironolactone at a lower dose if his renal function and potassium are appropriat
e at that point.  The plan was discussed with the patient in detail.  All questions were answered.  L
ow and high potassium foods handout was given.  We will follow up closely.





VV/MODL

DD:  03/16/2019 13:40:37Voice ID:  [id number]

DT:  03/16/2019 15:42:48Report ID:  [id number]

## 2019-03-18 NOTE — P.DS
Discharge Date: 03/16/19


Disposition: ROUTINE DISCHARGE


Discharge Condition: GOOD


Reason for Admission: Hyperkalemia


Consultations: 





Nephrology





- Problems


(1) Hyperkalemia


Status: Acute   





(2) Congestive heart failure


Status: Acute   





(3) CAD (coronary artery disease)


Onset Date: 02/06/19   Status: Acute   


Qualifiers: 


   Coronary Disease-Associated Artery/Lesion type: native artery   Native vs. 

transplanted heart: native heart   Associated angina: without angina   

Qualified Code(s): I25.10 - Atherosclerotic heart disease of native coronary 

artery without angina pectoris   





(4) COPD (chronic obstructive pulmonary disease)


Onset Date: 02/06/19   Status: Acute   


Qualifiers: 


   COPD type: unspecified COPD   Qualified Code(s): J44.9 - Chronic obstructive 

pulmonary disease, unspecified   





(5) Diabetes mellitus


Onset Date: 02/06/19   Status: Acute   


Qualifiers: 


   Diabetes mellitus type: type 2   Diabetes mellitus long term insulin use: 

with long term use   Diabetes mellitus complication status: with unspecified 

complications   Qualified Code(s): E11.8 - Type 2 diabetes mellitus with 

unspecified complications; Z79.4 - Long term (current) use of insulin   





(6) HTN (hypertension)


Onset Date: 02/06/19   Status: Acute   


Qualifiers: 


   Hypertension type: essential hypertension   Qualified Code(s): I10 - 

Essential (primary) hypertension   


Brief History of Present Illness: 





  Patient is an 79-year-old gentleman who came to the hospital  as he was sent 

in by his primary care provider because he had elevated potassium level.  He 

said he felt fine and did not feel any weakness.  In the ER his potassium was 

greater than 6.  He was admitted to the hospital and treated for his 

hyperkalemia.  He was recently started on Aldactone.  Because of the dosing it 

may have resulted in his hyperkalemia.  He is also on additional medications 

that can cause potassium retention.  And will need to be educated on diet 

because of his chronic kidney disease as well.  He will be admitted to the 

hospital for observation.


Hospital Course: 





Patient was given multiple medications along with Kayexalate.  Potassium was 

stable.  Nephrology was okay with discharge with close outpatient follow-up.  

Patient will be discharged on Kayexalate daily for next 4 days.


Vital Signs/Physical Exam: 














Temp Pulse Resp BP Pulse Ox


 


 97.1 F   100 H  18   114/69   98 


 


 03/16/19 14:13  03/16/19 14:13  03/16/19 14:13  03/16/19 14:13  03/16/19 14:13








General: Alert, In no apparent distress, Oriented x3


Laboratory Data at Discharge: 














WBC  8.1 K/uL (4.3-10.9)   03/16/19  06:10    


 


Hgb  12.6 g/dL (13.6-17.9)  L  03/16/19  06:10    


 


Hct  39.9 % (39.6-49.0)   03/16/19  06:10    


 


Plt Count  198 K/uL (152-406)   03/16/19  06:10    


 


Sodium  138 mmol/L (136-145)   03/16/19  13:28    


 


Potassium  5.7 mmol/L (3.5-5.1)  H*  03/16/19  13:28    


 


BUN  77 mg/dL (7-18)  H  03/16/19  13:28    


 


Creatinine  2.78 mg/dL (0.55-1.3)  H  03/16/19  13:28    


 


Glucose  256 mg/dL ()  H  03/16/19  13:28    


 


Uric Acid  5.2 mg/dL (3.5-7.2)   03/16/19  06:10    


 


Phosphorus  5.3 mg/dL (2.5-4.9)  H  03/16/19  06:10    


 


Magnesium  2.0 mg/dL (1.8-2.4)   03/16/19  06:10    


 


Total Bilirubin  0.2 mg/dL (0.2-1.0)   03/15/19  06:02    


 


AST  13 U/L (15-37)  L  03/15/19  06:02    


 


ALT  26 U/L (12-78)   03/15/19  06:02    


 


Alkaline Phosphatase  103 U/L ()   03/15/19  06:02    








Home Medications: 








Apixaban [Eliquis *] 2.5 mg PO BID 02/05/19 


Bumetanide [Bumex] 2 mg PO BID 02/05/19 


Ferrous Sulfate 325 mg PO BID 02/05/19 


Insulin Detemir [Levemir] 16 unit SQ BID 02/05/19 


Simvastatin 10 mg PO DAILY 02/05/19 


Tamsulosin HCl 0.4 mg PO DAILY 02/05/19 


predniSONE [Prednisone*] 5 mg PO DAILY 02/05/19 


traMADol HCL [Ultram*] 50 mg PO BIDP PRN 02/05/19 


Gabapentin 300 mg PO BID #60 capsule 02/08/19 


Metoprolol Tartrate [Lopressor*] 25 mg PO BID 6AM 6PM #60 tab 02/08/19 


Leflunomide 5 mg PO BID 03/15/19 


Sodium Polystyrene Sulfonate 15 gm PO DAILY #5 powder 03/17/19 





New Medications: 


Sodium Polystyrene Sulfonate 15 gm PO DAILY #5 powder


Patient Discharge Instructions: OK TO DC IV AND DC HOME.  FOLLOW-UP WITH 

PRIMARY CARE PROVIDER IN 1-2 WEEKS.  FOLLOW-UP WITH Dr. Farooq IN 1 WEEK.  

RETURN TO THE ER IF symptoms worsens.  CALL DR. VERDE [phone] IF ANY 

QUESTIONS REGARDING HOSPITAL STAY.  PLEASE CALL THE FLOOR [phone] IF 

ANY MEDICATION OR NURSING QUESTIONS.


Diet: Renal


Activity: Fall precautions


Followup: 


Lucy Hewitt MD [ACTIVE - CAN ADMIT] - 1-2 Weeks


Travis Escobedo MD [UNKNOWN] - 1-2 Weeks


Time spent managing pt's care (in minutes): 30

## 2019-07-02 ENCOUNTER — HOSPITAL ENCOUNTER (EMERGENCY)
Dept: HOSPITAL 97 - ER | Age: 79
Discharge: HOME | End: 2019-07-02
Payer: COMMERCIAL

## 2019-07-02 DIAGNOSIS — J18.9: Primary | ICD-10-CM

## 2019-07-02 DIAGNOSIS — Z79.4: ICD-10-CM

## 2019-07-02 DIAGNOSIS — I48.91: ICD-10-CM

## 2019-07-02 DIAGNOSIS — I50.9: ICD-10-CM

## 2019-07-02 DIAGNOSIS — E11.9: ICD-10-CM

## 2019-07-02 DIAGNOSIS — Z79.01: ICD-10-CM

## 2019-07-02 LAB
ALBUMIN SERPL BCP-MCNC: 3 G/DL (ref 3.4–5)
ALP SERPL-CCNC: 150 U/L (ref 45–117)
ALT SERPL W P-5'-P-CCNC: 40 U/L (ref 12–78)
AST SERPL W P-5'-P-CCNC: 22 U/L (ref 15–37)
BUN BLD-MCNC: 69 MG/DL (ref 7–18)
BUN BLD-MCNC: 71 MG/DL (ref 7–18)
CKMB CREATINE KINASE MB: 2.5 NG/ML (ref 0.3–3.6)
GLUCOSE SERPLBLD-MCNC: 208 MG/DL (ref 74–106)
GLUCOSE SERPLBLD-MCNC: 276 MG/DL (ref 74–106)
HCT VFR BLD CALC: 35.1 % (ref 39.6–49)
INR BLD: 1.26
LIPASE SERPL-CCNC: 324 U/L (ref 73–393)
LYMPHOCYTES # SPEC AUTO: 1.9 K/UL (ref 0.7–4.9)
PMV BLD: 9 FL (ref 7.6–11.3)
POTASSIUM SERPL-SCNC: 4.7 MMOL/L (ref 3.5–5.1)
POTASSIUM SERPL-SCNC: 5.4 MMOL/L (ref 3.5–5.1)
RBC # BLD: 3.87 M/UL (ref 4.33–5.43)
TROPONIN (EMERG DEPT USE ONLY): < 0.02 NG/ML (ref 0–0.04)

## 2019-07-02 PROCEDURE — 93005 ELECTROCARDIOGRAM TRACING: CPT

## 2019-07-02 PROCEDURE — 80076 HEPATIC FUNCTION PANEL: CPT

## 2019-07-02 PROCEDURE — 82550 ASSAY OF CK (CPK): CPT

## 2019-07-02 PROCEDURE — 85610 PROTHROMBIN TIME: CPT

## 2019-07-02 PROCEDURE — 96375 TX/PRO/DX INJ NEW DRUG ADDON: CPT

## 2019-07-02 PROCEDURE — 84443 ASSAY THYROID STIM HORMONE: CPT

## 2019-07-02 PROCEDURE — 83690 ASSAY OF LIPASE: CPT

## 2019-07-02 PROCEDURE — 84484 ASSAY OF TROPONIN QUANT: CPT

## 2019-07-02 PROCEDURE — 96372 THER/PROPH/DIAG INJ SC/IM: CPT

## 2019-07-02 PROCEDURE — 82553 CREATINE MB FRACTION: CPT

## 2019-07-02 PROCEDURE — 99285 EMERGENCY DEPT VISIT HI MDM: CPT

## 2019-07-02 PROCEDURE — 36415 COLL VENOUS BLD VENIPUNCTURE: CPT

## 2019-07-02 PROCEDURE — 71046 X-RAY EXAM CHEST 2 VIEWS: CPT

## 2019-07-02 PROCEDURE — 85025 COMPLETE CBC W/AUTO DIFF WBC: CPT

## 2019-07-02 PROCEDURE — 96366 THER/PROPH/DIAG IV INF ADDON: CPT

## 2019-07-02 PROCEDURE — 87040 BLOOD CULTURE FOR BACTERIA: CPT

## 2019-07-02 PROCEDURE — 96365 THER/PROPH/DIAG IV INF INIT: CPT

## 2019-07-02 PROCEDURE — 85730 THROMBOPLASTIN TIME PARTIAL: CPT

## 2019-07-02 PROCEDURE — 82962 GLUCOSE BLOOD TEST: CPT

## 2019-07-02 PROCEDURE — 80048 BASIC METABOLIC PNL TOTAL CA: CPT

## 2019-07-02 PROCEDURE — 83605 ASSAY OF LACTIC ACID: CPT

## 2019-07-02 PROCEDURE — 84145 PROCALCITONIN (PCT): CPT

## 2019-07-02 NOTE — EDPHYS
Physician Documentation                                                                           

 [facility]                                                                 

Name: Ken Gloria                                                                                

Age: 79 yrs                                                                                       

Sex: Male                                                                                         

: 1940                                                                                   

MRN: [id number]                                                                                   

Arrival Date: 2019                                                                          

Time: 14:49                                                                                       

Account#: [id number]                                                                            

Bed 24                                                                                            

Private MD:                                                                                       

ED Physician Arun Zarate                                                                    

HPI:                                                                                              

                                                                                             

15:14 This 79 yrs old  Male presents to ER via Wheelchair with complaints of Cough,  snw 

      Congestion.                                                                                 

15:14 The patient or guardian reports cough, flu symptoms. Onset: The symptoms/episode        snw 

      began/occurred gradually, 1 week(s) ago, and became persistent. Severity of symptoms:       

      At their worst the symptoms were moderate. Modifying factors: The symptoms are              

      alleviated by nothing. Associated signs and symptoms: The patient has no apparent           

      associated signs or symptoms. The patient has experienced similar episodes in the past.     

      The patient has been recently seen by a physician: the patient's primary care provider,     

      earlier today, with similar presenting complaints, sent [facility]             

      [place]. Pt declined.                                                                       

                                                                                                  

Historical:                                                                                       

- Allergies:                                                                                      

15:02 No Known Allergies;                                                                     sg  

- Home Meds:                                                                                      

15:34 metoprolol tartrate 50 mg oral tab 2 times per day [Active]; Eliquis 2.5 mg oral tab 2  aj  

      times per day [Active]; bumetanide 2 mg Oral tab 1 tab 2 times per day [Active];            

      Ferrous Sulfate Oral twice a day [Active]; finasteride 5 mg oral tab once daily             

      [Active]; gabapentin 300 mg Oral cap 1 cap 2 times daily [Active]; Levemir FlexTouch 16     

      units subcutaneous twice a day [Active]; leflunomide 10 mg Oral tab 1 tab once daily        

      [Active]; prednisone 5 mg/5 mL Oral soln once daily [Active]; simvastatin 10 mg Oral        

      tab 1 tab once daily [Active]; spironolactone 25 mg oral tab once daily [Active];           

      tamsulosin 0.4 mg Oral cp24 1 cap once daily [Active]; tramadol 50 mg Oral tab 1 tab        

      every 6 hours [Active]; allopurinol 300 mg Oral tab 1 tab once daily [Active];              

- PMHx:                                                                                           

15:02 a-fib; CHF; Diabetes - IDDM; Myocardial infarction; Rheumatoid Arthritis;               sg  

- PSHx:                                                                                           

15:02 bilateral knees; kidney;                                                                sg  

                                                                                                  

- Immunization history:: Adult Immunizations up to date.                                          

- Social history:: Smoking status: unknown.                                                       

- Ebola Screening: : Patient negative for fever greater than or equal to 101.5 degrees            

  Fahrenheit, and additional compatible Ebola Virus Disease symptoms Patient denies               

  exposure to infectious person Patient denies travel to an Ebola-affected area in the            

  21 days before illness onset No symptoms or risks identified at this time.                      

                                                                                                  

                                                                                                  

ROS:                                                                                              

15:14 Eyes: Negative for injury, pain, redness, and discharge, ENT: Negative for injury,      snw 

      pain, and discharge, Neck: Negative for injury, pain, and swelling, Cardiovascular:         

      Negative for chest pain, palpitations, and edema.                                           

15:14 Abdomen/GI: Negative for abdominal pain, nausea, vomiting, diarrhea, and constipation,      

      Back: Negative for injury and pain, : Negative for injury, bleeding, discharge, and       

      swelling, MS/Extremity: Negative for injury and deformity, Skin: Negative for injury,       

      rash, and discoloration, Neuro: Negative for headache, weakness, numbness, tingling,        

      and seizure.                                                                                

15:14 Constitutional: Positive for fatigue.                                                       

15:14 Respiratory: Positive for cough, with green sputum, wheezing.                               

                                                                                                  

Exam:                                                                                             

15:12 Head/Face:  Normocephalic, atraumatic. Eyes:  Pupils equal round and reactive to light, snw 

      extra-ocular motions intact.  Lids and lashes normal.  Conjunctiva and sclera are           

      non-icteric and not injected.  Cornea within normal limits.  Periorbital areas with no      

      swelling, redness, or edema. ENT:  Nares patent. No nasal discharge, no septal              

      abnormalities noted.  Tympanic membranes are normal and external auditory canals are        

      clear.  Oropharynx with no redness, swelling, or masses, exudates, or evidence of           

      obstruction, uvula midline.  Mucous membranes moist. Neck:  Trachea midline, no             

      thyromegaly or masses palpated, and no cervical lymphadenopathy.  Supple, full range of     

      motion without nuchal rigidity, or vertebral point tenderness.  No Meningismus.             

      Chest/axilla:  Normal chest wall appearance and motion.  Nontender with no deformity.       

      No lesions are appreciated.                                                                 

15:12 Abdomen/GI:  Soft, non-tender, with normal bowel sounds.  No distension or tympany.  No     

      guarding or rebound.  No evidence of tenderness throughout. Back:  No spinal                

      tenderness.  No costovertebral tenderness.  Full range of motion. Skin:  Warm, dry with     

      normal turgor.  Normal color with no rashes, no lesions, and no evidence of cellulitis.     

      MS/ Extremity:  Pulses equal, no cyanosis.  Neurovascular intact.  Full, normal range       

      of motion. Neuro:  Awake and alert, GCS 15, oriented to person, place, time, and            

      situation.  Cranial nerves II-XII grossly intact.  Motor strength 5/5 in all                

      extremities.  Sensory grossly intact.  Cerebellar exam normal.  Normal gait. Psych:         

      Awake, alert, with orientation to person, place and time.  Behavior, mood, and affect       

      are within normal limits.                                                                   

15:12 Constitutional: The patient appears alert, awake, obese.                                    

15:12 Cardiovascular: Rate: tachycardic, Rhythm: regular, Heart sounds: normal, Edema: 2+         

      edema to level of left ankle and right ankle, JVD: is not appreciated.                      

15:12 Respiratory: the patient does not display signs of respiratory distress,  Respirations:     

      shallow respirations, tachypnea, Breath sounds: rhonchi, are located in both bases,         

      wheezing: that is moderate, is scattered.                                                   

15:30 ECG was reviewed by the Attending Physician.                                            snw 

                                                                                                  

Vital Signs:                                                                                      

15:00  / 92; Pulse 132; Resp 24; Temp 98.9; Pulse Ox 97% on 3 lpm NC;                   sg  

15:29  / 72; Pulse 117; Resp 20; Pulse Ox 99% on 2 lpm NC;                              aj  

16:10  / 109; Pulse 133; Resp 23; Pulse Ox 98% on 2 lpm NC;                             aj  

16:23  / 89; Pulse 122; Resp 22; Pulse Ox 95% on 2 lpm NC;                              aj  

17:03  / 107; Pulse 116; Resp 19; Pulse Ox 97% on 2 lpm NC;                             aj  

17:41  / 82; Pulse 109; Resp 20; Pulse Ox 96% on 2 lpm NC;                              aj  

18:33  / 57; Pulse 110; Resp 16; Pulse Ox 97% on 2 lpm NC;                              aj  

                                                                                                  

MDM:                                                                                              

14:54 Patient medically screened.                                                             snw 

18:32 Data reviewed: vital signs, nurses notes. Data interpreted: Pulse oximetry: on 2L(s)    snw 

      per nasal canula, is 96 %. Interpretation: acceptable. Counseling: I had a detailed         

      discussion with the patient and/or guardian regarding: the historical points, exam          

      findings, and any diagnostic results supporting the discharge/admit diagnosis, the          

      presence of at least one elevated blood pressure reading (>120/80) during this              

      emergency department visit, lab results, radiology results, the need for outpatient         

      follow up, to return to the emergency department if symptoms worsen or persist or if        

      there are any questions or concerns that arise at home, Curb 65 - 2, pt for trial outpt     

      therapy, has O2 at home. Return precautions, risks, and benefits discussed..                

                                                                                                  

                                                                                             

15:08 Order name: Basic Metabolic Panel                                                         

                                                                                             

15:08 Order name: Blood Culture Adult (2)                                                       

                                                                                             

15:08 Order name: CBC with Diff                                                                 

                                                                                             

15:08 Order name: Ckmb                                                                          

                                                                                             

15:08 Order name: CPK                                                                           

                                                                                             

15:08 Order name: Lactate; Complete Time: 16:02                                                 

                                                                                             

15:08 Order name: LFT's                                                                         

                                                                                             

15:08 Order name: Lipase                                                                        

                                                                                             

15:08 Order name: Procalcitonin; Complete Time: 16:13                                           

                                                                                             

15:08 Order name: Protime (+inr); Complete Time: 16:02                                          

                                                                                             

15:08 Order name: Ptt, Activated; Complete Time: 16:02                                          

                                                                                             

15:08 Order name: Troponin (emerg Dept Use Only); Complete Time: 16:19                          

                                                                                             

15:09 Order name: Basic Metabolic Panel; Complete Time: 16:19                                 [facility]

                                                                                             

14:55 Order name: Chest Pa And Lat (2 Views) XRAY; Complete Time: 16:19                       [facility] 

                                                                                             

15:09 Order name: Blood Culture                                                               [facility]

                                                                                             

15:09 Order name: CBC with Automated Diff; Complete Time: 16:02                               [facility]

                                                                                             

15:10 Order name: CKMB Creatine Kinase MB; Complete Time: 16:19                               [facility]

                                                                                             

15:10 Order name: Creatine Phosphokinase; Complete Time: 16:19                                [facility]

                                                                                             

15:10 Order name: Liver (Hepatic) Function; Complete Time: 16:19                              [facility]

                                                                                             

15:10 Order name: Lipase; Complete Time: 16:19                                                [facility]

                                                                                             

15:10 Order name: TSH; Complete Time: 16:13                                                   [facility] 

                                                                                             

15:27 Order name: Glucose, Ancillary Testing                                                  [facility]

                                                                                             

17:54 Order name: Chem 7; Complete Time: 18:29                                                snw 

                                                                                             

15:08 Order name: EKG - Nurse/Tech; Complete Time: 15:24                                        

                                                                                             

15:08 Order name: Accucheck; Complete Time: 15:11                                               

                                                                                             

15:08 Order name: Cardiac monitoring; Complete Time: 15:11                                      

                                                                                             

15:08 Order name: EKG - Nurse/Tech; Complete Time: 15:23                                        

                                                                                             

15:08 Order name: IV Saline Lock - Large Bore; Complete Time: 15:11                             

                                                                                             

15:08 Order name: Labs collected and sent; Complete Time: 15:11                                 

                                                                                             

15:08 Order name: O2 Per Protocol; Complete Time: 15:11                                         

                                                                                             

15:08 Order name: O2 Sat Monitoring; Complete Time: 15:11                                       

                                                                                             

16:42 Order name: Diet 2 Gm Sodium; Complete Time: 16:42                                        

                                                                                             

17:06 Order name: Misc. Order: Repeat Chem 7 at 1800; Complete Time: 18:32                    [facility] 

                                                                                             

17:45 Order name: EKG Electrocardiogram                                                       [facility]

                                                                                                  

Administered Medications:                                                                         

15:08 Drug: NS 0.9% 500 ml Route: IV; Rate: bolus; Site: right antecubital;                   aj  

15:21 Drug: Insulin Regular Human 5 units {Co-Signature: mg2 (Omar Gonsales RN).} Route:    aj  

      IVP; Site: right antecubital;                                                               

16:17 Follow up: Response: Blood sugar is lowered                                             aj  

15:22 Drug: Insulin Regular Human 5 units {Co-Signature: mg2 (Omar Gonsales RN).} Route:    aj  

      Sub-Q; Site: right upper arm;                                                               

16:17 Follow up: Response: Blood sugar is lowered                                             aj  

15:26 Drug: Xopenex (3) 1.25 mg Route: Inhalation;                                            aj  

16:18 Follow up: Response: Wheezing diminished                                                aj  

15:35 CANCELLED (Physician Discretion): Aspirin Chewable Tablet 324 mg PO once; 81 mg tablets aj  

      x 4                                                                                         

15:38 Drug: Metoprolol 50 mg Route: PO;                                                       aj  

18:32 Follow up: Response: Blood pressure is lowered                                          aj  

16:37 Drug: Rocephin 1 grams Route: IV; Rate: calculated rate; Site: right antecubital;       aj  

18:44 Follow up: Response: No adverse reaction; IV Status: Completed infusion; IV Intake: 10mlaj  

16:41 Drug: Bumex 1 mg Route: IVP; Site: right antecubital;                                   aj  

18:33 Follow up: Response: No adverse reaction                                                aj  

                                                                                                  

                                                                                                  

Point of Care Testing:                                                                            

      Blood Glucose:                                                                              

15:15 Blood Glucose: 309 mg/dL;                                                               aj  

16:19 Blood Glucose: 208 mg/dL;                                                               aj  

      Ranges:                                                                                     

      Critical Glucose Levels:Adult <50 mg/dl or >400 mg/dl  <40 mg/dl or >180 mg/dl       

Disposition:                                                                                      

19:46 Co-signature as Attending Physician, Arun Zarate MD.                               ma2 

                                                                                                  

Disposition:                                                                                      

19 18:31 Discharged to Home. Impression: Pneumonia, unspecified organism, Hyperkalemia -    

  resolved.                                                                                       

- Condition is Stable.                                                                            

- Discharge Instructions: Hyperkalemia, Community-Acquired Pneumonia, Adult,                      

  Rehydration, Elderly.                                                                           

- Prescriptions for Augmentin 875- 125 mg Oral Tablet - take 1 tablet by ORAL route               

  every 12 hours for 10 days; 20 tablet. Albuterol Sulfate 90 mcg/actuation - inhale              

  1-2 puff by INHALATION route every 4-6 hours; 1 Inhaler. Zithromax 500 mg Oral Tablet           

  - take 1 tablet by ORAL route once daily for 5 days; 5 tablet.                                  

- Medication Reconciliation Form, Thank You Letter, Antibiotic Education, Prescription            

  Opioid Use form.                                                                                

- Follow up: Private Physician; When: 2 - 3 days; Reason: Recheck today's complaints,             

  Continuance of care, Re-evaluation by your physician. Follow up: Emergency                      

  Department; When: As needed; Reason: Worsening of condition.                                    

                                                                                                  

                                                                                                  

                                                                                                  

Signatures:                                                                                       

Dispatcher [employer]                           EDMS                                                 

Nadeem Vasquez RN                         Lauren Machado RN                       Audrey Pan, FNP-C                 FNP-Csnw                                                  

Arun Zarate MD MD   ma2                                                  

Omar Gonsales RN                           mg2                                                  

                                                                                                  

Corrections: (The following items were deleted from the chart)                                    

15:12 15:10 Chest Single View+[id number].BRZ ordered. [facility]

15:35 15:28 Aspirin Chewable Tablet 324 mg PO once; 81 mg tablets x 4 ordered. snw            niranjan  

18:44 18:31 2019 18:31 Discharged to Home. Impression: Pneumonia, unspecified organism; aj  

      Hyperkalemia - resolved. Condition is Stable. Forms are Medication Reconciliation Form,     

      Thank You Letter, Antibiotic Education, Prescription Opioid Use. Follow up: Private         

      Physician; When: 2 - 3 days; Reason: Recheck today's complaints, Continuance of care,       

      Re-evaluation by your physician. Follow up: Emergency Department; When: As needed;          

      Reason: Worsening of condition. snw                                                         

                                                                                                  

**************************************************************************************************

## 2019-07-02 NOTE — XMS REPORT
Patient Summary Document

 Created on:2019



Patient:MEL BRADFORD

Sex:Male

:1940

External Reference #:[id number]





Demographics







 Address  [address] [place] [address]

 

 Home Phone  [phone]

 

 Email Address  NONE

 

 Preferred Language  Unknown

 

 Marital Status  Unknown

 

 Gnosticist Affiliation  Unknown

 

 Race  Unknown

 

 Additional Race(s)  Unavailable

 

 Ethnic Group  Unknown









Author







 Organization  [facility]

 

 Address  [address] [place] [address] [place] [id number]

 

 Phone  [phone]









Care Team Providers







 Name  Role  Phone

 

 Unavailable  Unavailable  Unavailable









Problems

This patient has no known problems.



Allergies, Adverse Reactions, Alerts

This patient has no known allergies or adverse reactions.



Medications

This patient has no known medications.

## 2019-07-02 NOTE — RAD REPORT
EXAM DESCRIPTION:  RAD - Chest Pa And Lat (2 Views) - 7/2/2019 4:02 pm

 

CLINICAL HISTORY:  Cough, possible pneumonia

 

COMPARISON:  February 2019

 

TECHNIQUE:  AP and lateral views obtained peer

 

FINDINGS:  The lungs are slightly underinflated. No focal consolidation in the left hemithorax. Early
 opacification in the right middle lobe is suspected.  Failure/volume overload not suspected. Heart s
ize is normal and central vasculature is within normal limits.  No pleural effusion or pneumothorax s
een.  No acute bony finding noted.  No aortic abnormality.

 

IMPRESSION:  Suspected early right middle lobe pneumonia.

## 2019-07-03 VITALS — OXYGEN SATURATION: 97 % | SYSTOLIC BLOOD PRESSURE: 115 MMHG | DIASTOLIC BLOOD PRESSURE: 57 MMHG | TEMPERATURE: 98.9 F

## 2019-07-03 NOTE — EKG
Test Date:    2019-07-02               Test Time:    15:19:34

Technician:   CECI                                     

                                                     

MEASUREMENT RESULTS:                                       

Intervals:                                           

Rate:         130                                    

RI:                                                  

QRSD:         84                                     

QT:           302                                    

QTc:          444                                    

Axis:                                                

P:                                                   

RI:                                                  

QRS:          18                                     

T:            95                                     

                                                     

INTERPRETIVE STATEMENTS:                                       

                                                     

Atrial fibrillation with rapid ventricular response

Nonspecific ST and T wave abnormality, probably digitalis effect

Abnormal ECG

Compared to ECG 03/14/2019 21:23:07

ST (T wave) deviation now present



Electronically Signed On 07-03-19 06:53:17 CDT by Osmany Willoughby

## 2019-12-22 ENCOUNTER — HOSPITAL ENCOUNTER (EMERGENCY)
Dept: HOSPITAL 97 - ER | Age: 79
Discharge: ANOTHER HOSPITAL | End: 2019-12-22
Payer: COMMERCIAL

## 2019-12-22 VITALS — DIASTOLIC BLOOD PRESSURE: 68 MMHG | SYSTOLIC BLOOD PRESSURE: 95 MMHG

## 2019-12-22 VITALS — TEMPERATURE: 99 F

## 2019-12-22 VITALS — OXYGEN SATURATION: 98 %

## 2019-12-22 DIAGNOSIS — A41.9: ICD-10-CM

## 2019-12-22 DIAGNOSIS — I48.91: ICD-10-CM

## 2019-12-22 DIAGNOSIS — I25.2: ICD-10-CM

## 2019-12-22 DIAGNOSIS — N49.3: Primary | ICD-10-CM

## 2019-12-22 DIAGNOSIS — N17.9: ICD-10-CM

## 2019-12-22 DIAGNOSIS — J18.9: ICD-10-CM

## 2019-12-22 DIAGNOSIS — E87.5: ICD-10-CM

## 2019-12-22 DIAGNOSIS — E11.9: ICD-10-CM

## 2019-12-22 DIAGNOSIS — Z79.82: ICD-10-CM

## 2019-12-22 LAB
ALBUMIN SERPL BCP-MCNC: 2 G/DL (ref 3.4–5)
ALP SERPL-CCNC: 128 U/L (ref 45–117)
ALT SERPL W P-5'-P-CCNC: 799 U/L (ref 12–78)
ANISOCYTOSIS BLD QL: (no result)
AST SERPL W P-5'-P-CCNC: 1467 U/L (ref 15–37)
BUN BLD-MCNC: 95 MG/DL (ref 7–18)
BURR CELLS BLD QL SMEAR: (no result)
CKMB CREATINE KINASE MB: 2.5 NG/ML (ref 0.3–3.6)
COHGB MFR BLDA: 0.3 % (ref 0–1.5)
COHGB MFR BLDA: 0.4 % (ref 0–1.5)
COHGB MFR BLDA: 0.4 % (ref 0–1.5)
GLUCOSE SERPLBLD-MCNC: 171 MG/DL (ref 74–106)
HCT VFR BLD CALC: 31.9 % (ref 39.6–49)
INR BLD: 1.87
LIPASE SERPL-CCNC: 116 U/L (ref 73–393)
LYMPHOCYTES # SPEC AUTO: 2.1 K/UL (ref 0.7–4.9)
MORPHOLOGY BLD-IMP: (no result)
OXYHGB MFR BLDA: 98 % (ref 94–97)
OXYHGB MFR BLDA: 98.1 % (ref 94–97)
OXYHGB MFR BLDA: 98.1 % (ref 94–97)
PMV BLD: 9.7 FL (ref 7.6–11.3)
POIKILOCYTOSIS BLD QL SMEAR: (no result)
POTASSIUM SERPL-SCNC: 5.6 MMOL/L (ref 3.5–5.1)
RBC # BLD: 3.53 M/UL (ref 4.33–5.43)
SAO2 % BLDA: 99.3 % (ref 92–98.5)
TROPONIN (EMERG DEPT USE ONLY): 0.06 NG/ML (ref 0–0.04)
UA COMPLETE W REFLEX CULTURE PNL UR: (no result)

## 2019-12-22 PROCEDURE — 71045 X-RAY EXAM CHEST 1 VIEW: CPT

## 2019-12-22 PROCEDURE — 81015 MICROSCOPIC EXAM OF URINE: CPT

## 2019-12-22 PROCEDURE — 83605 ASSAY OF LACTIC ACID: CPT

## 2019-12-22 PROCEDURE — 85730 THROMBOPLASTIN TIME PARTIAL: CPT

## 2019-12-22 PROCEDURE — 99291 CRITICAL CARE FIRST HOUR: CPT

## 2019-12-22 PROCEDURE — 84145 PROCALCITONIN (PCT): CPT

## 2019-12-22 PROCEDURE — 87205 SMEAR GRAM STAIN: CPT

## 2019-12-22 PROCEDURE — 85025 COMPLETE CBC W/AUTO DIFF WBC: CPT

## 2019-12-22 PROCEDURE — 96366 THER/PROPH/DIAG IV INF ADDON: CPT

## 2019-12-22 PROCEDURE — 82553 CREATINE MB FRACTION: CPT

## 2019-12-22 PROCEDURE — 82805 BLOOD GASES W/O2 SATURATION: CPT

## 2019-12-22 PROCEDURE — 84484 ASSAY OF TROPONIN QUANT: CPT

## 2019-12-22 PROCEDURE — 82947 ASSAY GLUCOSE BLOOD QUANT: CPT

## 2019-12-22 PROCEDURE — 96367 TX/PROPH/DG ADDL SEQ IV INF: CPT

## 2019-12-22 PROCEDURE — 96368 THER/DIAG CONCURRENT INF: CPT

## 2019-12-22 PROCEDURE — 81003 URINALYSIS AUTO W/O SCOPE: CPT

## 2019-12-22 PROCEDURE — 82550 ASSAY OF CK (CPK): CPT

## 2019-12-22 PROCEDURE — 93005 ELECTROCARDIOGRAM TRACING: CPT

## 2019-12-22 PROCEDURE — 87040 BLOOD CULTURE FOR BACTERIA: CPT

## 2019-12-22 PROCEDURE — 87804 INFLUENZA ASSAY W/OPTIC: CPT

## 2019-12-22 PROCEDURE — 80048 BASIC METABOLIC PNL TOTAL CA: CPT

## 2019-12-22 PROCEDURE — 83690 ASSAY OF LIPASE: CPT

## 2019-12-22 PROCEDURE — 96365 THER/PROPH/DIAG IV INF INIT: CPT

## 2019-12-22 PROCEDURE — 74176 CT ABD & PELVIS W/O CONTRAST: CPT

## 2019-12-22 PROCEDURE — 99292 CRITICAL CARE ADDL 30 MIN: CPT

## 2019-12-22 PROCEDURE — 85610 PROTHROMBIN TIME: CPT

## 2019-12-22 PROCEDURE — 94660 CPAP INITIATION&MGMT: CPT

## 2019-12-22 PROCEDURE — 51702 INSERT TEMP BLADDER CATH: CPT

## 2019-12-22 PROCEDURE — 80076 HEPATIC FUNCTION PANEL: CPT

## 2019-12-22 PROCEDURE — 71250 CT THORAX DX C-: CPT

## 2019-12-22 PROCEDURE — 36415 COLL VENOUS BLD VENIPUNCTURE: CPT

## 2019-12-22 NOTE — RAD REPORT
EXAM DESCRIPTION:  CT - Chest Abd Pelvis Wo Con - 12/22/2019 11:17 am

 

CLINICAL HISTORY:  Chest and abdomen pain.

abdominal pain

 

COMPARISON:  No comparisons

 

TECHNIQUE:  A limited noncontrast study was performed.

 

All CT scans are performed using dose optimization technique as appropriate and may include automated
 exposure control or mA/KV adjustment according to patient size.

 

FINDINGS:  Opacities are present in both posterior lung bases, greater on the left, likely representi
ng infiltrate/pneumonia.The lungs are otherwise underinflated.No pleural or pericardial effusion.Card
iomegaly is present, mild.No intrathoracic adenopathy.

 

Noncontrast assessment of the liver, spleen, pancreas, adrenal glands within normal limits. Small josé luis
culi are present in both kidneys without hydronephrosis.

 

No bowel obstruction, free air, free fluid or abscess. Normal appendix. Small bilateral fat containin
g inguinal hernia. No pathologic lymphadenopathy in the abdomen or pelvis. Air is present in the raf
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

 

Moderate lumbosacral degenerative changes.

 

IMPRESSION:  Ill-defined opacities in both lung bases posteriorly, greater on the left, most compatib
le with infiltrate/pneumonia.

 

Small bilateral renal calculi seen without hydronephrosis.

 

Scrotal swelling with Kate's gangrene findings noted.

## 2019-12-22 NOTE — ER
Nurse's Notes                                                                                     

 [facility]                                                                 

Name: Ken Gloria                                                                                

Age: 79 yrs                                                                                       

Sex: Male                                                                                         

: 1940                                                                                   

MRN: [id number]                                                                                   

Arrival Date: 2019                                                                          

Time: 08:45                                                                                       

Account#: [id number]                                                                            

Bed 3                                                                                             

Private MD:                                                                                       

Diagnosis: Kate gangrene;Sepsis, unspecified organism;Hyperkalemia;Pneumonia, unspecified     

  organism;Acute on chronic renal failure                                                         

                                                                                                  

Presentation:                                                                                     

                                                                                             

08:42 Presenting complaint: EMS states: he was minimally responsive when we arrived, pt from  tw2 

      home, family said he hasnt made urine since Friday, and that [facility] sent him     

      into kidney failure, he has copd, recent end stage renal disease, not a dialysis pt, we     

      were unsuccessful in our iv attempt. Transition of care: patient was not received from      

      another setting of care. Onset of symptoms was 2019. Risk Assessment: Do       

      you want to hurt yourself or someone else? Patient reports no desire to harm self or        

      others. Initial Sepsis Screen: Does the patient meet any 2 criteria? RR > 20 per min.       

      HR > 90 bpm. Yes Does the patient have a suspected source of infection? Yes:                

      Dysuria/Frequency/Urgency/UTI If YES to both, name of provider notified: Geo Lizama MD Care prior to arrival: Oxygen administered. via a non-rebreather mask.                   

08:42 Method Of Arrival: EMS: [facility]                                                    tw2 

08:42 Acuity: DEWAYNE 1                                                                           tw2 

                                                                                                  

Triage Assessment:                                                                                

08:45 General: Appears Behavior is drowsy, listless. Neuro: Level of Consciousness is obeys   tw2 

      commands, Oriented to person. Derm: Skin is mottled, Skin temperature is cold.              

                                                                                                  

Historical:                                                                                       

- Allergies:                                                                                      

08:58 No Known Allergies;                                                                     tw2 

- Home Meds:                                                                                      

08:58 allopurinol 300 mg Oral tab 1 tab once daily [Active]; aspirin 81 mg Oral chew 1 tab    tw2 

      once daily [Active]; atorvastatin 20 mg oral tab 1 tab once daily [Active]; brimonidine     

      0.2 % ophthalmic drop 1 drop every 8 hours [Active]; dorzolamide-timolol (PF)               

      ophthalmic ophthalmic [Active]; ferrous sulfate 325 mg (65 mg iron) Oral tab [Active];      

      gabapentin 300 mg Oral cap 1 cap 2 times daily [Active]; Levemir FlexTouch 16 units         

      subcutaneous twice a day [Active]; metoprolol tartrate 75 mg oral tab [Active];             

      prednisone 5 mg/5 mL Oral soln once daily [Active]; tamsulosin 0.4 mg Oral cp24 1 cap       

      once daily [Active]; tramadol 50 mg Oral tab 1 tab every 6 hours [Active];                  

- PMHx:                                                                                           

08:58 a-fib; Rheumatoid Arthritis; Diabetes - IDDM; Myocardial infarction; CHF;               tw2 

- PSHx:                                                                                           

08:58 bilateral knees; kidney;                                                                tw2 

                                                                                                  

- Immunization history:: Last tetanus immunization:.                                              

- Social history:: Patient uses Smoking status: .                                                 

- Ebola Screening: : Patient denies travel to an Ebola-affected area in the 21 days               

  before illness onset.                                                                           

                                                                                                  

                                                                                                  

Screenin:02 Abuse screen: Denies threats or abuse. Nutritional screening: No deficits noted.        tw2 

      Tuberculosis screening: No symptoms or risk factors identified. Fall Risk Secondary         

      diagnosis (15 points) impaired mobility.                                                    

                                                                                                  

Assessment:                                                                                       

09:04 Reassessment: Dr Lizama at bedside to place central line.                              ph  

09:04 General: Appears distressed, uncomfortable, obese, Behavior is cooperative, drowsy,     ph  

      quiet. Neuro: Level of Consciousness is awake, lethargic, listless, Oriented to person,     

      place, situation.                                                                           

09:05 Pain: Denies pain.                                                                      ph  

09:09 Cardiovascular: Capillary refill is sluggish in bilateral fingers Rhythm is atrial      ph  

      fibrillation. Respiratory: Airway is patent Respiratory effort is even, Respiratory         

      pattern is tachypnea. GI: Abdomen is round. : Reports inability to void, since            

      Friday. Derm: Skin is fragile, is thin, Skin is pale, Skin temperature is cool.             

      Musculoskeletal: Circulation, motion, and sensation intact. Range of motion: intact in      

      all extremities.                                                                            

09:09 : Swelling noted on scrotum.                                                          ph  

09:55 Reassessment: Spo2 noted to have decreased to 85-90% on NRB at 15L, ERP notified and RT ph  

      at bedside to draw ABGs. Reassessment: RT to repeat ABGs in approx 30 min, Spo2 remains     

      low on NRB, BP improving w/ Levophed, bi-pap on standby.                                    

10:33 Reassessment: Patient appears in no apparent distress at this time. RT at bedside to    ph  

      draw second ABG.                                                                            

10:45 Reassessment: Patient appears in no apparent distress at this time. No changes from     ph  

      previously documented assessment. Pt placed on Bi-pap, tolerating well at this time,        

      will repeat ABGs in 30 min to determine if pt will need to be intubated.                    

10:58 Reassessment: Pt taken to CT on bi-pap, accompanied by RN and RT.                       ph  

12:00 Reassessment: Patient appears in no apparent distress at this time. No changes from     ph  

      previously documented assessment.                                                           

13:03 Reassessment: Patient appears in no apparent distress at this time. No changes from     ph  

      previously documented assessment. Patient and/or family updated on plan of care and         

      expected duration. Pain level reassessed. Pt resting quietly w/ bi-pap in place, pt         

      remains drowsy but opens eyes and responds verbally when addressed by name, awaiting        

      transfer.                                                                                   

13:08 Reassessment: Report given to [facility] ETA 20 min.                                   ph  

13:24 Reassessment: Rep[ort given to NATHALIE Lindsey at [facility] ICU.                        ph  

13:41 Reassessment: Patient appears in no apparent distress at this time. Patient and/or      ph  

      family updated on plan of care and expected duration. Pain level reassessed. [facility]           

      [facility] at bedside, report given to flight nurse, preparing pt for transport.                

                                                                                                  

Vital Signs:                                                                                      

08:55 BP 68 / 33; Pulse 114; Resp 26; Temp 99.2(C); Pulse Ox 100% on Non-rebreather mask;     tw2 

09:01 Weight 102.06 kg (R);                                                                   tw2 

09:15 BP 68 / 49; Pulse 111; Resp 26 S; Pulse Ox 100% on Non-rebreather mask;                 iw  

09:25 Temp 100.2(C);                                                                          ph  

09:45 BP 77 / 63; Pulse 108; Resp 24 S; Pulse Ox 92% on Non-rebreather mask;                  iw  

10:05 BP 96 / 65; Pulse 111; Resp 24 S; Pulse Ox 100% on Non-rebreather mask;                 iw  

10:15  / 78; Pulse 114; Resp 22; Temp 99.1(C); Pulse Ox 85% on 100% Non-rebreather mask;ph  

10:33  / 71; Pulse 115; Resp 18; Temp 98.9; Pulse Ox 92% on 100% Non-rebreather mask;   ph  

10:45  / 83; Pulse 115; Resp 18; Temp 98.8; Pulse Ox 88% on 60% BiPAP;                  ph  

11:00  / 93; Pulse 112; Resp 20; Temp 98.6; Pulse Ox 87% on 60% BiPAP;                  ph  

11:27 BP 98 / 63; Pulse 117; Resp 18; Temp 98.7(C); Pulse Ox 84% on 40% BiPAP;                ph  

11:45  / 70; Pulse 111; Resp 18; Temp 98.8(C); Pulse Ox 83% on 40% BiPAP;               ph  

12:00 BP 88 / 67; Pulse 115; Resp 20; Temp 98.8(C); Pulse Ox 81% on 40% BiPAP;                ph  

12:15 BP 90 / 60; Pulse 115; Resp 18; Temp 98.9(C); Pulse Ox 85% on 40% BiPAP;                ph  

12:31 BP 90 / 68; Pulse 110; Resp 18; Temp 99.0(C); Pulse Ox 86% on 40% BiPAP;                ph  

12:45 BP 86 / 61; Pulse 116; Resp 20; Temp 99.0(C); Pulse Ox 97% on 40% BiPAP;                ph  

13:00 BP 87 / 62; Pulse 113; Resp 18; Temp 99.0(C); Pulse Ox 98% on 40% BiPAP;                ph  

13:15 BP 89 / 71; Pulse 116; Resp 18; Temp 99.0; Pulse Ox 98% on 40% BiPAP;                   ph  

13:30 BP 95 / 68; Pulse 113; Resp 18; Temp 99.0(C); Pulse Ox 98% on 40% BiPAP;                ph  

11:27 DR Lizama aware of Spo2, ABGs x 3 done to confirm that O2 level is stable              ph  

13:00 pulse oximeter moved to finger                                                          ph  

                                                                                                  

Sharon Coma Score:                                                                               

13:11 Eye Response: to voice(3). Verbal Response: confused(4). Motor Response: localizes      ph  

      pain(5). Total: 12.                                                                         

                                                                                                  

ED Course:                                                                                        

08:42 Placed in gown. Bed in low position. Side rails up X2. Cardiac monitor on. Pulse ox on. tw2 

      NIBP on. defibrillator monitor, Dr. Lizaam and Oksana,RT , NATHALIE Kearney, NATHALIE Shields and         

      NATHALIE Bucio at bedside at this time.                                                          

08:45 Patient arrived in ED.                                                                  bd  

08:50 Missed attempt(s): 22 gauge in left antecubital area. Bleeding controlled, band aid     tw2 

      applied, catheter tip intact.                                                               

08:50 Inserted saline lock: 20 gauge in right antecubital area, using aseptic technique.      ph  

08:54 Triage completed.                                                                       tw2 

08:54 Arm band placed on. EKG completed in triage. Results shown to MD.                       tw2 

08:55 Alyson Aguilar, RN is Primary Nurse.                                                    ph  

08:59 Gonzalez cath inserted, using sterile technique, 16 Fr., by me, balloon inflated, to       jl7 

      gravity drainage, urine specimen collected. other Temp cath used returned clear yellow      

      urine. Patient tolerated well.                                                              

09:15 Assisted provider with central line placement. Set up central line tray. Triple lumen   ph  

      line placed in right femoral. Line placed by Geo Lizama MD Placement verified by         

      CXR, blood return, Dressed with Tegaderm, Patient tolerated well. Before procedure, did     

      Practitioner(s) obtain informed consent? Yes. Patient \T\ family education about            

      procedure, CLABSI prevention and S/S of infection? Yes. Time-out/Briefing performed         

      prior to start of procedure? Yes. Was handwashing/sanitizing done immediately prior to      

      procedure? Yes. Was patient positioned to in a way to prevent air embolism? Yes. Was        

      procedure site sterilized? Yes, with betadine. Was the site allowed to dry? Yes. Was        

      local anesthetic and/or sedation utilized? Yes. During the procedure, did the               

      Practitioner(s) maintain a sterile field? Yes. Were unused ports clamped during             

      insertion? Yes. Was a 2nd qualified MD obtained after 3 unsuccessful insertion              

      attempts? No. Was blood aspirated from each lumen? Yes. After the procedure, did the        

      Practitioner(s) clean the site and apply a sterile dressing? Yes.                           

09:23 Geo Lizama MD is Attending Physician.                                              kdr 

09:29 Chest Single View XRAY In Process Unspecified.                                          EDMS

10:25 Marisela Schreiber MD is Hospitalizing Provider.                                        kdr 

11:17 CT Chest Abdomen Pelvis W/O Contrast In Process Unspecified.                            EDMS

12:09 attempted transfer to [facility].                                          bd  

12:17 pt denied at [facility] due to no icu beds, per cornelia.                       bd  

12:26 attempted transfer to [facility].                                             bd  

13:26 pt accepted at [facility] by dr Carnes. per capri tomlinson.                               bd  

13:54 Patient transferred, IV remains in place.                                               ph  

                                                                                                  

Administered Medications:                                                                         

09:03 Drug: NS 0.9% (30 ml/kg) 30 ml/kg Route: IV; Rate: bolus; Site: right antecubital;      ph  

10:50 Follow up: Response: No adverse reaction; IV Intake: 3000ml                             ph  

10:50 Follow up: Response: No adverse reaction; IV Status: Completed infusion; IV Intake:     ph  

      3000ml                                                                                      

09:25 Drug: Levophed (4 mg/250 mL D5W 4 mcg/min Route: IV; Rate: calculated rate; Site: right ph  

      femoral;                                                                                    

13:49 Follow up: Response: No adverse reaction; IV Status: Infusion continued upon transfer;  ph  

      IV Intake: 500ml                                                                            

09:30 Drug: Tylenol Suppository 650 mg Route: KY;                                             ph  

12:20 Follow up: Response: No adverse reaction; Temperature is decreased                      ph  

10:03 Drug: Rocephin - (cefTRIAXone) 2 grams Route: IVPB; Infused Over: 30 mins; Site: right  ph  

      femoral;                                                                                    

10:15 Follow up: Response: No adverse reaction; IV Status: Completed infusion                 ph  

10:08 Drug: Sodium Bicarbonate 1 amp Route: IVP; Site: right femoral;                         ph  

12:22 Follow up: Response: No adverse reaction                                                ph  

10:20 Drug: Calcium Chloride 10% 10 ml Route: IVP; Site: right femoral;                       ph  

12:24 Follow up: Response: No adverse reaction                                                ph  

10:25 Drug: D50W 25 ml Route: IVP; Site: right femoral;                                       ph  

12:23 Follow up: Response: No adverse reaction                                                ph  

10:27 Drug: Insulin Regular Human 5 units {Co-Signature: vc (Breonna Khalil RN).} Route:     ph  

      IVP; Site: right femoral;                                                                   

12:23 Follow up: Response: No adverse reaction                                                ph  

10:28 Not Given (Other Intervention Used): Calcium Gluconate 1 grams IVPB once over 60 mins;  ph  

      (mix in  mL)                                                                          

10:28 Not Given (Duplicate Order): Sodium Bicarbonate 1 amp IVP once; (50 mL); equals 50 mEq  ph  

10:50 Drug: NS 0.9% 1000 ml Route: IV; Rate: 100 ml/hr; Site: right femoral;                  ph  

13:47 Follow up: Response: No adverse reaction; IV Status: Infusion continued upon transfer;  ph  

      IV Intake: 300ml                                                                            

12:14 Drug: Flagyl 500 mg Volume: 100 ml; Route: IVPB; Rate: 200 ml/hr; Infused Over: 30      ph  

      mins; Site: right femoral;                                                                  

12:45 Follow up: Response: No adverse reaction; IV Status: Completed infusion; IV Intake:     ph  

      100ml                                                                                       

12:24 Drug: Gentamicin 1 mg/kg Route: IVPB; Infused Over: 30 mins; Site: right femoral;       ph  

12:55 Follow up: Response: No adverse reaction; IV Status: Completed infusion; IV Intake:     ph  

      100ml                                                                                       

                                                                                                  

                                                                                                  

Intake:                                                                                           

10:50 IV: 3000ml; Total: 3000ml.                                                              ph  

10:50 IV: 3000ml; Total: 6000ml.                                                              ph  

12:45 IV: 100ml; Total: 6100ml.                                                               ph  

12:55 IV: 100ml; Total: 6200ml.                                                               ph  

13:47 IV: 300ml; Total: 6500ml.                                                               ph  

13:49 IV: 500ml; Total: 7000ml.                                                               ph  

                                                                                                  

Outcome:                                                                                          

10:27 Decision to Hospitalize by Provider.                                                    kdr 

12:02 ER care complete, transfer ordered by MD.                                               kdr 

13:54 Transferred by helicopter to [facility], Transfer form completed. X-rays sent ph  

      w/ patient. Note:  on bi-pap                                                                

13:54 critical                                                                                    

13:54 Instructed on the need for transfer.                                                        

14:00 Patient left the ED.                                                                    ph  

                                                                                                  

Signatures:                                                                                       

Dispatcher [employer]                           EDMS                                                 

Rika Doe Kevin, MD MD kdr Williams, Irene, RN RN iw Hall, Patricia, RN RN ph Wise, Tara, RN RN   tw2                                                  

Rupinder Juarez RN RN   jl7                                                  

Breonna Khalil RN                           vc                                                   

                                                                                                  

Corrections: (The following items were deleted from the chart)                                    

10:29 10:15 Calcium Chloride 10% 10 ml IVP in right femoral ph                                ph  

13:46 13:45 Response: No adverse reaction; IV Status: Completed infusion; IV Intake: 100ml ph ph  

                                                                                                  

**************************************************************************************************

## 2019-12-22 NOTE — XMS REPORT
Patient Summary Document

 Created on:2019



Patient:MEL BRADFORD

Sex:Male

:1940

External Reference #:[id number]





Demographics







 Address  [address] [place] [address]

 

 Home Phone  [phone]

 

 Email Address  NONE

 

 Preferred Language  Unknown

 

 Marital Status  Unknown

 

 Bahai Affiliation  Unknown

 

 Race  Unknown

 

 Additional Race(s)  Unavailable

 

 Ethnic Group  Unknown









Author







 Organization  [facility]

 

 Address  [address] [place] [address] [place] [id number]

 

 Phone  [phone]









Care Team Providers







 Name  Role  Phone

 

 Unavailable  Unavailable  Unavailable









Problems

This patient has no known problems.



Allergies, Adverse Reactions, Alerts

This patient has no known allergies or adverse reactions.



Medications

This patient has no known medications.



Encounters







 Start  End  Encounter  Admission  Attending  Care  Care  Encounter



 Date/Time  Date/Time  Type  Type  Clinicians  Facility  Department  ID

 

 2019  Emergency  E    [id number]  [id number]



 09:33:00  09:33:00

## 2019-12-22 NOTE — RAD REPORT
EXAM DESCRIPTION:  RAD - Chest Single View - 12/22/2019 9:31 am

 

CLINICAL HISTORY:  unresponsive

Chest pain.

 

COMPARISON:  Chest Pa And Lat (2 Views) dated 7/2/2019; Chest Single View dated 2/5/2019

 

FINDINGS:  Portable technique limits examination quality.

 

The lungs are underinflated but grossly clear. The heart is mildly enlarged in size. Trace left pleur
al effusion is suspected.

## 2019-12-22 NOTE — EDPHYS
Physician Documentation                                                                           

 [facility]                                                                 

Name: Ken Gloria                                                                                

Age: 79 yrs                                                                                       

Sex: Male                                                                                         

: 1940                                                                                   

MRN: [id number]                                                                                   

Arrival Date: 2019                                                                          

Time: 08:45                                                                                       

Account#: [id number]                                                                            

Bed 3                                                                                             

Private MD:                                                                                       

ED Physician Geo Lizama                                                                       

HPI:                                                                                              

                                                                                             

09:23 This 79 yrs old  Male presents to ER via EMS with complaints of unresponsive.  kdr 

09:23 The patient presents with decreased mental status, decreased responsiveness. The        kdr 

      patient presents with The patient was seen by me on arrival at the time of transfer to      

      hospital bed from EMS stretcher. Onset: The symptoms/episode began/occurred at an           

      unknown time. Possible causes: CVA or TIA, sepsis. Associated signs and symptoms:           

      Pertinent positives: abdominal pain. Current symptoms: In the emergency department the      

      patient's symptoms are unchanged from the initial presentation, EMS reported that the       

      patient was unresponsive but he does answer questions appropriately when prompted.          

      Patient's baseline: Neuro: alert and fully oriented, Motor: no deficits, Ambulation:        

      walks with assist only. It is unknown whether or not the patient has had similar            

      symptoms in the past. It is unknown whether or not the patient has recently seen a          

      physician, EMS reported that the patient had recently been in a nursing home and then       

      taken home since apparently he was doing well enough. . The patient has been home from      

      nursing home last Monday and had been doing well since then until yesterday when he         

      slept more and had decreased urine output. His wife gave him more Lasix but without         

      much apparent improvement.                                                                  

                                                                                                  

Historical:                                                                                       

- Allergies:                                                                                      

08:58 No Known Allergies;                                                                     tw2 

- Home Meds:                                                                                      

08:58 allopurinol 300 mg Oral tab 1 tab once daily [Active]; aspirin 81 mg Oral chew 1 tab    tw2 

      once daily [Active]; atorvastatin 20 mg oral tab 1 tab once daily [Active]; brimonidine     

      0.2 % ophthalmic drop 1 drop every 8 hours [Active]; dorzolamide-timolol (PF)               

      ophthalmic ophthalmic [Active]; ferrous sulfate 325 mg (65 mg iron) Oral tab [Active];      

      gabapentin 300 mg Oral cap 1 cap 2 times daily [Active]; Levemir FlexTouch 16 units         

      subcutaneous twice a day [Active]; metoprolol tartrate 75 mg oral tab [Active];             

      prednisone 5 mg/5 mL Oral soln once daily [Active]; tamsulosin 0.4 mg Oral cp24 1 cap       

      once daily [Active]; tramadol 50 mg Oral tab 1 tab every 6 hours [Active];                  

- PMHx:                                                                                           

08:58 a-fib; Rheumatoid Arthritis; Diabetes - IDDM; Myocardial infarction; CHF;               tw2 

- PSHx:                                                                                           

08:58 bilateral knees; kidney;                                                                tw2 

                                                                                                  

- Immunization history:: Last tetanus immunization:.                                              

- Social history:: Patient uses Smoking status: .                                                 

- Ebola Screening: : Patient denies travel to an Ebola-affected area in the 21 days               

  before illness onset.                                                                           

                                                                                                  

                                                                                                  

ROS:                                                                                              

09:23 Constitutional: Negative for fever, chills, and weight loss,  The patient is a          kdr 

      generally poor histroian but had no focal c/o.  Stated he was doing well and family         

      does not report any new c/o other than yesterday when he was sleeping more and              

      decreased urine output                                                                      

                                                                                                  

Exam:                                                                                             

09:23 Constitutional:  This is a well developed, well nourished  obese patient who is         kdr 

      somnolent but open his eyes to questions and responds appropriately to questions.  He       

      denies any SOB or pain Head/Face:  Normocephalic, atraumatic. Eyes:  Pupils equal round     

      and reactive to light, extra-ocular motions intact.  Lids and lashes normal.                

      Conjunctiva and sclera are non-icteric and not injected.  Cornea within normal limits.      

      Periorbital areas with no swelling, redness, or edema. Neck:  Trachea midline, no           

      thyromegaly or masses palpated, and no cervical lymphadenopathy.  Supple, full range of     

      motion without nuchal rigidity, or vertebral point tenderness.  No Meningismus.             

      Chest/axilla:  Normal chest wall appearance and motion.  Nontender with no deformity.       

      No lesions are appreciated. Cardiovascular:  Regular rate and rhythm with a normal S1       

      and S2.  No gallops, murmurs, or rubs.  Normal PMI, no JVD.  No pulse deficits.             

      Abdomen/GI:  Soft, minorly  non-tender, with normal bowel sounds.  Obese but no             

      apparent distension or tympany.  No guarding or rebound.                                    

10:27 ECG was reviewed by the Attending Physician.                                            kdr 

                                                                                                  

Vital Signs:                                                                                      

08:55 BP 68 / 33; Pulse 114; Resp 26; Temp 99.2(C); Pulse Ox 100% on Non-rebreather mask;     tw2 

09:01 Weight 102.06 kg (R);                                                                   tw2 

09:15 BP 68 / 49; Pulse 111; Resp 26 S; Pulse Ox 100% on Non-rebreather mask;                 iw  

09:25 Temp 100.2(C);                                                                          ph  

09:45 BP 77 / 63; Pulse 108; Resp 24 S; Pulse Ox 92% on Non-rebreather mask;                  iw  

10:05 BP 96 / 65; Pulse 111; Resp 24 S; Pulse Ox 100% on Non-rebreather mask;                 iw  

10:15  / 78; Pulse 114; Resp 22; Temp 99.1(C); Pulse Ox 85% on 100% Non-rebreather mask;ph  

10:33  / 71; Pulse 115; Resp 18; Temp 98.9; Pulse Ox 92% on 100% Non-rebreather mask;   ph  

10:45  / 83; Pulse 115; Resp 18; Temp 98.8; Pulse Ox 88% on 60% BiPAP;                  ph  

11:00  / 93; Pulse 112; Resp 20; Temp 98.6; Pulse Ox 87% on 60% BiPAP;                  ph  

11:27 BP 98 / 63; Pulse 117; Resp 18; Temp 98.7(C); Pulse Ox 84% on 40% BiPAP;                ph  

11:45  / 70; Pulse 111; Resp 18; Temp 98.8(C); Pulse Ox 83% on 40% BiPAP;               ph  

12:00 BP 88 / 67; Pulse 115; Resp 20; Temp 98.8(C); Pulse Ox 81% on 40% BiPAP;                ph  

12:15 BP 90 / 60; Pulse 115; Resp 18; Temp 98.9(C); Pulse Ox 85% on 40% BiPAP;                ph  

12:31 BP 90 / 68; Pulse 110; Resp 18; Temp 99.0(C); Pulse Ox 86% on 40% BiPAP;                ph  

12:45 BP 86 / 61; Pulse 116; Resp 20; Temp 99.0(C); Pulse Ox 97% on 40% BiPAP;                ph  

13:00 BP 87 / 62; Pulse 113; Resp 18; Temp 99.0(C); Pulse Ox 98% on 40% BiPAP;                ph  

13:15 BP 89 / 71; Pulse 116; Resp 18; Temp 99.0; Pulse Ox 98% on 40% BiPAP;                   ph  

13:30 BP 95 / 68; Pulse 113; Resp 18; Temp 99.0(C); Pulse Ox 98% on 40% BiPAP;                ph  

11:27 DR Lizama aware of Spo2, ABGs x 3 done to confirm that O2 level is stable              ph  

13:00 pulse oximeter moved to finger                                                          ph  

                                                                                                  

[place] Coma Score:                                                                               

13:11 Eye Response: to voice(3). Verbal Response: confused(4). Motor Response: localizes      ph  

      pain(5). Total: 12.                                                                         

                                                                                                  

MDM:                                                                                              

10:27 Patient medically screened.                                                             kdr 

10:27 Data reviewed: vital signs, nurses notes, lab test result(s), EKG, radiologic studies.  kdr 

      Counseling: I had a detailed discussion with the patient and/or guardian regarding: the     

      historical points, exam findings, and any diagnostic results supporting the                 

      discharge/admit diagnosis, lab results, radiology results, the need for further work-up     

      and treatment in the hospital.                                                              

                                                                                                  

                                                                                             

08:52 Order name: Basic Metabolic Panel                                                         

                                                                                             

08:52 Order name: Blood Culture Adult (2)                                                     iw  

                                                                                             

08:52 Order name: CBC with Diff                                                                                                                                                            

08:52 Order name: Ckmb; Complete Time: 10:01                                                    

                                                                                             

08:52 Order name: CPK; Complete Time: 10:01                                                     

                                                                                             

08:52 Order name: Lactate; Complete Time: 10:30                                               iw  

                                                                                             

08:52 Order name: LFT's; Complete Time: 10:01                                                   

                                                                                             

08:52 Order name: Lipase; Complete Time: 10:01                                                  

                                                                                             

08:52 Order name: Procalcitonin; Complete Time: 09:47                                         iw  

                                                                                             

08:52 Order name: Protime (+inr); Complete Time: 09:47                                        iw  

                                                                                             

08:52 Order name: Ptt, Activated; Complete Time: 09:47                                          

                                                                                             

08:52 Order name: Troponin (emerg Dept Use Only); Complete Time: 10:01                        iw  

                                                                                             

08:52 Order name: Urine Microscopic Only; Complete Time: 09:47                                  

                                                                                             

08:53 Order name: Basic Metabolic Panel; Complete Time: 10:01                                 EDMS

                                                                                             

08:52 Order name: Chest Single View XRAY; Complete Time: 10:30                                iw  

                                                                                             

09:03 Order name: Glucose, Ancillary Testing; Complete Time: 09:47                            EDMS

                                                                                             

09:06 Order name: Urine Dipstick--Ancillary (enter results); Complete Time: 09:47             bd  

                                                                                             

09:57 Order name: Manual Differential; Complete Time: 10:01                                   EDMS

                                                                                             

10:12 Order name: ABG; Complete Time: 11:35                                                   kdr 

                                                                                             

10:14 Order name: ABG Arterial Blood Gas; Complete Time: 10:30                                EDMS

                                                                                             

10:34 Order name: CT Chest Abdomen Pelvis W/O Contrast; Complete Time: 11:55                  kdr 

                                                                                             

10:34 Order name: Flu; Complete Time: 11:35                                                   kdr 

                                                                                             

11:38 Order name: ABG Arterial Blood Gas; Complete Time: 11:42                                EDMS

                                                                                             

08:52 Order name: Accucheck; Complete Time: 08:58                                             iw  

                                                                                             

08:52 Order name: Cardiac monitoring; Complete Time: 08:58                                    iw  

                                                                                             

08:52 Order name: EKG - Nurse/Tech; Complete Time: 08:58                                      iw  

                                                                                             

08:52 Order name: IV Saline Lock - Large Bore; Complete Time: 08:58                           iw  

                                                                                             

08:52 Order name: Labs collected and sent; Complete Time: 08:59                               iw  

                                                                                             

08:52 Order name: O2 Per Protocol; Complete Time: 08:59                                       iw  

                                                                                             

08:52 Order name: O2 Sat Monitoring; Complete Time: 08:59                                     iw  

                                                                                             

08:52 Order name: Urine Dipstick-Ancillary (obtain specimen); Complete Time: 08:59            iw  

                                                                                                  

ECG:                                                                                              

10:27 Rate is 118 beats/min. Rhythm is irregularly irregular, A fib with No ectopy,           kdr 

      Occasional PVCs. QRS Axis is Normal. Clinical impression: Atrial Fibrillation and No        

      evidence of ischemia.                                                                       

                                                                                                  

Administered Medications:                                                                         

09:03 Drug: NS 0.9% (30 ml/kg) 30 ml/kg Route: IV; Rate: bolus; Site: right antecubital;      ph  

10:50 Follow up: Response: No adverse reaction; IV Intake: 3000ml                             ph  

10:50 Follow up: Response: No adverse reaction; IV Status: Completed infusion; IV Intake:     ph  

      3000ml                                                                                      

09:25 Drug: Levophed (4 mg/250 mL D5W 4 mcg/min Route: IV; Rate: calculated rate; Site: right ph  

      femoral;                                                                                    

13:49 Follow up: Response: No adverse reaction; IV Status: Infusion continued upon transfer;  ph  

      IV Intake: 500ml                                                                            

09:30 Drug: Tylenol Suppository 650 mg Route: HI;                                             ph  

12:20 Follow up: Response: No adverse reaction; Temperature is decreased                      ph  

10:03 Drug: Rocephin - (cefTRIAXone) 2 grams Route: IVPB; Infused Over: 30 mins; Site: right  ph  

      femoral;                                                                                    

10:15 Follow up: Response: No adverse reaction; IV Status: Completed infusion                 ph  

10:08 Drug: Sodium Bicarbonate 1 amp Route: IVP; Site: right femoral;                         ph  

12:22 Follow up: Response: No adverse reaction                                                ph  

10:20 Drug: Calcium Chloride 10% 10 ml Route: IVP; Site: right femoral;                       ph  

12:24 Follow up: Response: No adverse reaction                                                ph  

10:25 Drug: D50W 25 ml Route: IVP; Site: right femoral;                                       ph  

12:23 Follow up: Response: No adverse reaction                                                ph  

10:27 Drug: Insulin Regular Human 5 units {Co-Signature: vc (Breonna Khalil RN).} Route:     ph  

      IVP; Site: right femoral;                                                                   

12:23 Follow up: Response: No adverse reaction                                                ph  

10:28 Not Given (Other Intervention Used): Calcium Gluconate 1 grams IVPB once over 60 mins;  ph  

      (mix in  mL)                                                                          

10:28 Not Given (Duplicate Order): Sodium Bicarbonate 1 amp IVP once; (50 mL); equals 50 mEq  ph  

10:50 Drug: NS 0.9% 1000 ml Route: IV; Rate: 100 ml/hr; Site: right femoral;                  ph  

13:47 Follow up: Response: No adverse reaction; IV Status: Infusion continued upon transfer;  ph  

      IV Intake: 300ml                                                                            

12:14 Drug: Flagyl 500 mg Volume: 100 ml; Route: IVPB; Rate: 200 ml/hr; Infused Over: 30      ph  

      mins; Site: right femoral;                                                                  

12:45 Follow up: Response: No adverse reaction; IV Status: Completed infusion; IV Intake:     ph  

      100ml                                                                                       

12:24 Drug: Gentamicin 1 mg/kg Route: IVPB; Infused Over: 30 mins; Site: right femoral;       ph  

12:55 Follow up: Response: No adverse reaction; IV Status: Completed infusion; IV Intake:     ph  

      100ml                                                                                       

                                                                                                  

                                                                                                  

Disposition:                                                                                      

19 12:02 Transfer ordered to [facility]. Diagnosis are Kate         

  gangrene, Sepsis, unspecified organism, Hyperkalemia, Pneumonia, unspecified                    

  organism, Acute on chronic renal failure.                                                       

- Reason for transfer: Higher level of care.                                                      

- Accepting physician is [facility] ICU.                                                          

- Condition is Serious.                                                                           

- Problem is new.                                                                                 

- Symptoms have improved.                                                                         

                                                                                                  

                                                                                                  

                                                                                                  

Signatures:                                                                                       

Dispatcher [employer]                           EDMS                                                 

Geo Lizama MD MD   kdr                                                  

Loli Kern RN                     RN                                                      

Alyson Aguilar RN                      RN   ph                                                   

Christel Woodson, RN                          RN   tw2                                                  

Breonna Khalil RN                           vc                                                   

                                                                                                  

Corrections: (The following items were deleted from the chart)                                    

10:14 10:13 Arterial Blood Gas+[id number] ordered. [facility]                                       EDMS

11:59 10:27 Hospitalization Ordered by Marisela Schreiber MD for Inpatient Admission.           kdr 

      Preliminary diagnosis is Weakness; Altered mental status, unspecified; Hypotension,         

      unspecified; Hyperkalemia. Bed requested for Intensive Care Unit. Status is Inpatient       

      Admission. Condition is Serious. Problem is new. Symptoms have improved. UTI on             

      Admission? No. kdr                                                                          

14:00 12:02 2019 12:02 Transfer ordered to [facility]. Diagnosis is ph  

      Kate gangrene; Sepsis, unspecified organism; Hyperkalemia; Pneumonia, unspecified       

      organism; Acute on chronic renal failure. Reason for transfer: Higher level of care.        

      Accepting physician is [facility] ICU. Condition is Serious. Problem is new. Symptoms       

      have improved. kdr                                                                          

                                                                                                  

**************************************************************************************************

## 2019-12-23 NOTE — EKG
Test Date:    2019-12-22               Test Time:    08:52:12

Technician:   GRAYSON                                    

                                                     

MEASUREMENT RESULTS:                                       

Intervals:                                           

Rate:         119                                    

NJ:                                                  

QRSD:         108                                    

QT:           316                                    

QTc:          444                                    

Axis:                                                

P:                                                   

NJ:                                                  

QRS:          93                                     

T:            4                                      

                                                     

INTERPRETIVE STATEMENTS:                                       

                                                     

Atrial fibrillation with rapid ventricular response with premature

ventricular or aberrantly conducted 

complexes

Rightward axis

Nonspecific T wave abnormality, probably digitalis effect

Abnormal ECG

Compared to ECG 07/02/2019 15:19:34

Ventricular premature complex(es) now present

Right-axis deviation now present

T-wave abnormality now present

ST (T wave) deviation no longer present



Electronically Signed On 12-23-19 20:46:52 CST by Osmany Willoughby